# Patient Record
Sex: FEMALE | Race: OTHER | NOT HISPANIC OR LATINO
[De-identification: names, ages, dates, MRNs, and addresses within clinical notes are randomized per-mention and may not be internally consistent; named-entity substitution may affect disease eponyms.]

---

## 2017-02-01 ENCOUNTER — APPOINTMENT (OUTPATIENT)
Dept: INTERNAL MEDICINE | Facility: CLINIC | Age: 67
End: 2017-02-01

## 2017-02-08 ENCOUNTER — APPOINTMENT (OUTPATIENT)
Dept: INTERNAL MEDICINE | Facility: CLINIC | Age: 67
End: 2017-02-08

## 2017-08-31 ENCOUNTER — APPOINTMENT (OUTPATIENT)
Dept: INTERNAL MEDICINE | Facility: CLINIC | Age: 67
End: 2017-08-31
Payer: MEDICARE

## 2017-08-31 VITALS
SYSTOLIC BLOOD PRESSURE: 120 MMHG | TEMPERATURE: 98.2 F | DIASTOLIC BLOOD PRESSURE: 80 MMHG | BODY MASS INDEX: 26.08 KG/M2 | WEIGHT: 138 LBS | HEART RATE: 84 BPM

## 2017-08-31 DIAGNOSIS — F17.200 NICOTINE DEPENDENCE, UNSPECIFIED, UNCOMPLICATED: ICD-10-CM

## 2017-08-31 PROCEDURE — 99397 PER PM REEVAL EST PAT 65+ YR: CPT | Mod: 25

## 2017-08-31 PROCEDURE — 93000 ELECTROCARDIOGRAM COMPLETE: CPT

## 2017-08-31 RX ORDER — PNV NO.95/FERROUS FUM/FOLIC AC 28MG-0.8MG
TABLET ORAL
Refills: 0 | Status: ACTIVE | COMMUNITY

## 2017-09-01 LAB
ALBUMIN SERPL ELPH-MCNC: 4.4 G/DL
ALP BLD-CCNC: 53 U/L
ALT SERPL-CCNC: 21 U/L
ANION GAP SERPL CALC-SCNC: 16 MMOL/L
APPEARANCE: CLEAR
AST SERPL-CCNC: 28 U/L
BACTERIA: NEGATIVE
BASOPHILS # BLD AUTO: 0.02 K/UL
BASOPHILS NFR BLD AUTO: 0.3 %
BILIRUB SERPL-MCNC: 0.6 MG/DL
BILIRUBIN URINE: NEGATIVE
BLOOD URINE: NEGATIVE
BUN SERPL-MCNC: 18 MG/DL
CALCIUM SERPL-MCNC: 10.2 MG/DL
CHLORIDE SERPL-SCNC: 102 MMOL/L
CHOLEST SERPL-MCNC: 192 MG/DL
CHOLEST/HDLC SERPL: 2.4 RATIO
CO2 SERPL-SCNC: 22 MMOL/L
COLOR: YELLOW
CREAT SERPL-MCNC: 1.04 MG/DL
EOSINOPHIL # BLD AUTO: 0.05 K/UL
EOSINOPHIL NFR BLD AUTO: 0.8 %
GLUCOSE QUALITATIVE U: NORMAL MG/DL
GLUCOSE SERPL-MCNC: 104 MG/DL
HBA1C MFR BLD HPLC: 5.6 %
HCT VFR BLD CALC: 42.4 %
HDLC SERPL-MCNC: 79 MG/DL
HGB BLD-MCNC: 13.9 G/DL
HYALINE CASTS: 0 /LPF
IMM GRANULOCYTES NFR BLD AUTO: 0.2 %
KETONES URINE: NEGATIVE
LDLC SERPL CALC-MCNC: 101 MG/DL
LDLC SERPL DIRECT ASSAY-MCNC: 114 MG/DL
LEUKOCYTE ESTERASE URINE: NEGATIVE
LYMPHOCYTES # BLD AUTO: 2.49 K/UL
LYMPHOCYTES NFR BLD AUTO: 41 %
MAN DIFF?: NORMAL
MCHC RBC-ENTMCNC: 29.3 PG
MCHC RBC-ENTMCNC: 32.8 GM/DL
MCV RBC AUTO: 89.3 FL
MICROSCOPIC-UA: NORMAL
MONOCYTES # BLD AUTO: 0.43 K/UL
MONOCYTES NFR BLD AUTO: 7.1 %
NEUTROPHILS # BLD AUTO: 3.07 K/UL
NEUTROPHILS NFR BLD AUTO: 50.6 %
NITRITE URINE: NEGATIVE
PH URINE: 7
PLATELET # BLD AUTO: 162 K/UL
POTASSIUM SERPL-SCNC: 4.5 MMOL/L
PROT SERPL-MCNC: 7.1 G/DL
PROTEIN URINE: NEGATIVE MG/DL
RBC # BLD: 4.75 M/UL
RBC # FLD: 12.7 %
RED BLOOD CELLS URINE: 1 /HPF
SAVE SPECIMEN: NORMAL
SODIUM SERPL-SCNC: 140 MMOL/L
SPECIFIC GRAVITY URINE: 1.01
SQUAMOUS EPITHELIAL CELLS: 0 /HPF
TRIGL SERPL-MCNC: 58 MG/DL
TSH SERPL-ACNC: 0.78 UIU/ML
UROBILINOGEN URINE: NORMAL MG/DL
WBC # FLD AUTO: 6.07 K/UL
WHITE BLOOD CELLS URINE: 0 /HPF

## 2017-09-05 LAB — 25(OH)D3 SERPL-MCNC: 31.2 NG/ML

## 2017-10-03 ENCOUNTER — OTHER (OUTPATIENT)
Age: 67
End: 2017-10-03

## 2017-10-31 ENCOUNTER — APPOINTMENT (OUTPATIENT)
Dept: MAMMOGRAPHY | Facility: IMAGING CENTER | Age: 67
End: 2017-10-31
Payer: MEDICARE

## 2017-10-31 ENCOUNTER — OUTPATIENT (OUTPATIENT)
Dept: OUTPATIENT SERVICES | Facility: HOSPITAL | Age: 67
LOS: 1 days | End: 2017-10-31
Payer: MEDICARE

## 2017-10-31 DIAGNOSIS — Z12.31 ENCOUNTER FOR SCREENING MAMMOGRAM FOR MALIGNANT NEOPLASM OF BREAST: ICD-10-CM

## 2017-10-31 PROCEDURE — 77067 SCR MAMMO BI INCL CAD: CPT

## 2017-10-31 PROCEDURE — 77063 BREAST TOMOSYNTHESIS BI: CPT | Mod: 26

## 2017-10-31 PROCEDURE — 77063 BREAST TOMOSYNTHESIS BI: CPT

## 2017-10-31 PROCEDURE — G0202: CPT | Mod: 26

## 2017-11-03 ENCOUNTER — RESULT REVIEW (OUTPATIENT)
Age: 67
End: 2017-11-03

## 2017-11-20 ENCOUNTER — OTHER (OUTPATIENT)
Age: 67
End: 2017-11-20

## 2017-12-18 ENCOUNTER — APPOINTMENT (OUTPATIENT)
Dept: ULTRASOUND IMAGING | Facility: IMAGING CENTER | Age: 67
End: 2017-12-18

## 2017-12-26 ENCOUNTER — TRANSCRIPTION ENCOUNTER (OUTPATIENT)
Age: 67
End: 2017-12-26

## 2018-01-01 ENCOUNTER — TRANSCRIPTION ENCOUNTER (OUTPATIENT)
Age: 68
End: 2018-01-01

## 2018-02-06 ENCOUNTER — APPOINTMENT (OUTPATIENT)
Dept: INTERNAL MEDICINE | Facility: CLINIC | Age: 68
End: 2018-02-06
Payer: MEDICARE

## 2018-02-06 VITALS
HEART RATE: 81 BPM | OXYGEN SATURATION: 98 % | DIASTOLIC BLOOD PRESSURE: 82 MMHG | SYSTOLIC BLOOD PRESSURE: 122 MMHG | BODY MASS INDEX: 25.11 KG/M2 | HEIGHT: 61 IN | TEMPERATURE: 97.7 F | WEIGHT: 133 LBS

## 2018-02-06 DIAGNOSIS — R35.1 NOCTURIA: ICD-10-CM

## 2018-02-06 PROCEDURE — 36415 COLL VENOUS BLD VENIPUNCTURE: CPT

## 2018-02-06 PROCEDURE — 99214 OFFICE O/P EST MOD 30 MIN: CPT | Mod: 25

## 2018-02-06 PROCEDURE — 99406 BEHAV CHNG SMOKING 3-10 MIN: CPT | Mod: 25

## 2018-02-06 RX ORDER — AZITHROMYCIN 250 MG/1
250 TABLET, FILM COATED ORAL
Qty: 6 | Refills: 0 | Status: COMPLETED | COMMUNITY
Start: 2017-12-26

## 2018-02-06 RX ORDER — ZOLPIDEM TARTRATE 5 MG/1
5 TABLET, FILM COATED ORAL
Qty: 1 | Refills: 0 | Status: DISCONTINUED | COMMUNITY
Start: 2017-08-31 | End: 2018-02-06

## 2018-02-06 RX ORDER — ACETAMINOPHEN/DIPHENHYDRAMINE 500MG-25MG
TABLET ORAL
Refills: 0 | Status: DISCONTINUED | COMMUNITY
End: 2018-02-06

## 2018-02-07 LAB
ANION GAP SERPL CALC-SCNC: 13 MMOL/L
BUN SERPL-MCNC: 18 MG/DL
CALCIUM SERPL-MCNC: 9.9 MG/DL
CHLORIDE SERPL-SCNC: 100 MMOL/L
CO2 SERPL-SCNC: 26 MMOL/L
CREAT SERPL-MCNC: 1.07 MG/DL
GLUCOSE SERPL-MCNC: 108 MG/DL
HBA1C MFR BLD HPLC: 5.4 %
POTASSIUM SERPL-SCNC: 4.7 MMOL/L
SODIUM SERPL-SCNC: 139 MMOL/L
TSH SERPL-ACNC: 0.73 UIU/ML

## 2018-02-08 ENCOUNTER — RESULT REVIEW (OUTPATIENT)
Age: 68
End: 2018-02-08

## 2018-02-08 LAB — VIT B12 SERPL-MCNC: 1658 PG/ML

## 2018-02-14 ENCOUNTER — TRANSCRIPTION ENCOUNTER (OUTPATIENT)
Age: 68
End: 2018-02-14

## 2018-02-19 ENCOUNTER — CLINICAL ADVICE (OUTPATIENT)
Age: 68
End: 2018-02-19

## 2018-02-22 ENCOUNTER — APPOINTMENT (OUTPATIENT)
Dept: INTERNAL MEDICINE | Facility: CLINIC | Age: 68
End: 2018-02-22
Payer: MEDICARE

## 2018-02-22 VITALS
SYSTOLIC BLOOD PRESSURE: 118 MMHG | WEIGHT: 132 LBS | TEMPERATURE: 97.5 F | HEIGHT: 61 IN | DIASTOLIC BLOOD PRESSURE: 84 MMHG | BODY MASS INDEX: 24.92 KG/M2 | HEART RATE: 79 BPM | OXYGEN SATURATION: 98 %

## 2018-02-22 PROCEDURE — 99214 OFFICE O/P EST MOD 30 MIN: CPT

## 2018-02-26 ENCOUNTER — CLINICAL ADVICE (OUTPATIENT)
Age: 68
End: 2018-02-26

## 2018-02-27 ENCOUNTER — APPOINTMENT (OUTPATIENT)
Dept: INTERNAL MEDICINE | Facility: CLINIC | Age: 68
End: 2018-02-27

## 2018-06-21 DIAGNOSIS — M54.16 RADICULOPATHY, LUMBAR REGION: ICD-10-CM

## 2018-07-23 ENCOUNTER — APPOINTMENT (OUTPATIENT)
Dept: INTERNAL MEDICINE | Facility: CLINIC | Age: 68
End: 2018-07-23
Payer: MEDICARE

## 2018-07-23 VITALS
HEIGHT: 61 IN | SYSTOLIC BLOOD PRESSURE: 120 MMHG | WEIGHT: 132 LBS | DIASTOLIC BLOOD PRESSURE: 76 MMHG | BODY MASS INDEX: 24.92 KG/M2 | TEMPERATURE: 98 F

## 2018-07-23 DIAGNOSIS — Z87.2 PERSONAL HISTORY OF DISEASES OF THE SKIN AND SUBCUTANEOUS TISSUE: ICD-10-CM

## 2018-07-23 DIAGNOSIS — R20.8 OTHER DISTURBANCES OF SKIN SENSATION: ICD-10-CM

## 2018-07-23 DIAGNOSIS — Z87.898 PERSONAL HISTORY OF OTHER SPECIFIED CONDITIONS: ICD-10-CM

## 2018-07-23 DIAGNOSIS — Z11.59 ENCOUNTER FOR SCREENING FOR OTHER VIRAL DISEASES: ICD-10-CM

## 2018-07-23 DIAGNOSIS — Z12.31 ENCOUNTER FOR SCREENING MAMMOGRAM FOR MALIGNANT NEOPLASM OF BREAST: ICD-10-CM

## 2018-07-23 PROCEDURE — 99214 OFFICE O/P EST MOD 30 MIN: CPT

## 2018-07-23 NOTE — PHYSICAL EXAM
[Normal TMs] : both tympanic membranes were normal [de-identified] : nasal turbinates- mild congested. [de-identified] : hypopigmentation- with mild pinkish tone- on ant chest.

## 2018-07-23 NOTE — HISTORY OF PRESENT ILLNESS
[FreeTextEntry1] : f/u neuropathy [de-identified] : pt was seen by Neuro- had EMG- neuropathyL4-L5- pt was rec for physical theraphy.  occasional back pain- in the AM on awakening or sitting for prolonged period- has some pain.  no radiation of back pain.\par pt was told to physical therapy and given to gabapentin- pt takes dose at night and it helps\par \par no diarrhea now- pt cut down on cheese, yogart- pt is sure it is due to dairy and dosesn't want lactose intolerance test\par taking ambien- not using it\par \par R ear- decr hearing.  no ear pain\par \par chronic rash on ant chest- and was told it was fungus  given selsum blue- rash worse in the winter- mild improvement w treatment

## 2018-09-26 ENCOUNTER — APPOINTMENT (OUTPATIENT)
Dept: DERMATOLOGY | Facility: CLINIC | Age: 68
End: 2018-09-26

## 2018-10-02 ENCOUNTER — APPOINTMENT (OUTPATIENT)
Dept: INTERNAL MEDICINE | Facility: CLINIC | Age: 68
End: 2018-10-02
Payer: MEDICARE

## 2018-10-02 VITALS
WEIGHT: 134 LBS | HEIGHT: 61 IN | SYSTOLIC BLOOD PRESSURE: 120 MMHG | BODY MASS INDEX: 25.3 KG/M2 | DIASTOLIC BLOOD PRESSURE: 60 MMHG | TEMPERATURE: 97.5 F

## 2018-10-02 PROCEDURE — 99214 OFFICE O/P EST MOD 30 MIN: CPT

## 2018-10-02 NOTE — HISTORY OF PRESENT ILLNESS
[FreeTextEntry1] : foot pain [de-identified] : ear congestion- pt was seen by ENT- pt was given nose drops, and her ears were cleaned.  hearing is better. now congested occasionally.  pt states flonase irritated her nose\par \par pt was seen by Neuro for lumbar radiculopathy- tx w gabapentin, PT\par pt states she has R foot pain - mid arch- pt was told by other doctor that to ck for gout.  pt had pain-1  month- no daily but several times / wk\par \par \par never had colonoscopy- no chg in bm,  blood in stool, weight loss.  no family hx of colon cancer or colon polyps\par pt didn't do US breast for dense breast. \par \par rash- pt has appt with Derm next wk\par \par pt stopped dairy- and freq of bm improved- no diarrhea, abd pain\par pt is not completely compliant lactose free diet\par \par \par better when elevating the foot.  worse at night- megan after standing for prolonged period, humid temp.\par pt has relief w ibuprofen.  pain- mild-mod ache. duration of pain - several hrs. \par no heel pain.  has pain when she is still in bed\par in the AM - she has no pain.

## 2018-10-02 NOTE — PLAN
[FreeTextEntry1] : pt will do breast US.  \par pt refusing H breath test. \par pt refused flu vaccine, pneum vaccine\par f/u Derm\par nasal congestion- nasal saline\par pt doesn't want colonoscopy- rec cologuard

## 2018-10-02 NOTE — PHYSICAL EXAM
[de-identified] : varicose vein [de-identified] : no pain on palpation of L foot.  [de-identified] : hypopigmented macular rash on neck

## 2018-10-17 ENCOUNTER — FORM ENCOUNTER (OUTPATIENT)
Age: 68
End: 2018-10-17

## 2018-10-18 ENCOUNTER — LABORATORY RESULT (OUTPATIENT)
Age: 68
End: 2018-10-18

## 2018-10-18 ENCOUNTER — FORM ENCOUNTER (OUTPATIENT)
Age: 68
End: 2018-10-18

## 2018-10-18 ENCOUNTER — OUTPATIENT (OUTPATIENT)
Dept: OUTPATIENT SERVICES | Facility: HOSPITAL | Age: 68
LOS: 1 days | End: 2018-10-18
Payer: MEDICARE

## 2018-10-18 ENCOUNTER — APPOINTMENT (OUTPATIENT)
Dept: RADIOLOGY | Facility: IMAGING CENTER | Age: 68
End: 2018-10-18
Payer: MEDICARE

## 2018-10-18 ENCOUNTER — APPOINTMENT (OUTPATIENT)
Dept: INTERNAL MEDICINE | Facility: CLINIC | Age: 68
End: 2018-10-18
Payer: MEDICARE

## 2018-10-18 ENCOUNTER — APPOINTMENT (OUTPATIENT)
Dept: MAMMOGRAPHY | Facility: IMAGING CENTER | Age: 68
End: 2018-10-18
Payer: MEDICARE

## 2018-10-18 ENCOUNTER — RESULT CHARGE (OUTPATIENT)
Age: 68
End: 2018-10-18

## 2018-10-18 VITALS
WEIGHT: 132 LBS | DIASTOLIC BLOOD PRESSURE: 82 MMHG | BODY MASS INDEX: 24.92 KG/M2 | HEIGHT: 61 IN | OXYGEN SATURATION: 98 % | HEART RATE: 71 BPM | TEMPERATURE: 96 F | SYSTOLIC BLOOD PRESSURE: 120 MMHG

## 2018-10-18 DIAGNOSIS — H93.8X9 OTHER SPECIFIED DISORDERS OF EAR, UNSPECIFIED EAR: ICD-10-CM

## 2018-10-18 DIAGNOSIS — Z86.59 PERSONAL HISTORY OF OTHER MENTAL AND BEHAVIORAL DISORDERS: ICD-10-CM

## 2018-10-18 DIAGNOSIS — Z12.31 ENCOUNTER FOR SCREENING MAMMOGRAM FOR MALIGNANT NEOPLASM OF BREAST: ICD-10-CM

## 2018-10-18 DIAGNOSIS — Z87.898 PERSONAL HISTORY OF OTHER SPECIFIED CONDITIONS: ICD-10-CM

## 2018-10-18 PROCEDURE — 77067 SCR MAMMO BI INCL CAD: CPT

## 2018-10-18 PROCEDURE — 77063 BREAST TOMOSYNTHESIS BI: CPT | Mod: 26

## 2018-10-18 PROCEDURE — 77063 BREAST TOMOSYNTHESIS BI: CPT

## 2018-10-18 PROCEDURE — 77080 DXA BONE DENSITY AXIAL: CPT

## 2018-10-18 PROCEDURE — G0438: CPT | Mod: 25

## 2018-10-18 PROCEDURE — 93000 ELECTROCARDIOGRAM COMPLETE: CPT

## 2018-10-18 PROCEDURE — 94010 BREATHING CAPACITY TEST: CPT

## 2018-10-18 PROCEDURE — 99213 OFFICE O/P EST LOW 20 MIN: CPT | Mod: 25

## 2018-10-18 PROCEDURE — 77067 SCR MAMMO BI INCL CAD: CPT | Mod: 26

## 2018-10-18 RX ORDER — GABAPENTIN 300 MG/1
300 CAPSULE ORAL DAILY
Qty: 2 | Refills: 0 | Status: DISCONTINUED | COMMUNITY
End: 2018-10-18

## 2018-10-18 RX ORDER — PSYLLIUM HUSK 0.4 G
CAPSULE ORAL
Refills: 0 | Status: DISCONTINUED | COMMUNITY
End: 2018-10-18

## 2018-10-18 RX ORDER — ACETAMINOPHEN 325 MG
TABLET ORAL
Refills: 0 | Status: DISCONTINUED | COMMUNITY
End: 2018-10-18

## 2018-10-18 NOTE — PHYSICAL EXAM
[No Acute Distress] : no acute distress [Well Nourished] : well nourished [Well Developed] : well developed [Well-Appearing] : well-appearing [Normal Sclera/Conjunctiva] : normal sclera/conjunctiva [PERRL] : pupils equal round and reactive to light [Normal Outer Ear/Nose] : the outer ears and nose were normal in appearance [Normal Oropharynx] : the oropharynx was normal [Normal TMs] : both tympanic membranes were normal [Supple] : supple [No Lymphadenopathy] : no lymphadenopathy [Thyroid Normal, No Nodules] : the thyroid was normal and there were no nodules present [No Respiratory Distress] : no respiratory distress  [Clear to Auscultation] : lungs were clear to auscultation bilaterally [No Accessory Muscle Use] : no accessory muscle use [Normal Rate] : normal rate  [Regular Rhythm] : with a regular rhythm [Normal S1, S2] : normal S1 and S2 [No Murmur] : no murmur heard [No Carotid Bruits] : no carotid bruits [Pedal Pulses Present] : the pedal pulses are present [No Edema] : there was no peripheral edema [Soft] : abdomen soft [Non Tender] : non-tender [Non-distended] : non-distended [Normal Bowel Sounds] : normal bowel sounds [Normal Supraclavicular Nodes] : no supraclavicular lymphadenopathy [Normal Axillary Nodes] : no axillary lymphadenopathy [Normal Posterior Cervical Nodes] : no posterior cervical lymphadenopathy [Normal Anterior Cervical Nodes] : no anterior cervical lymphadenopathy [Normal Inguinal Nodes] : no inguinal lymphadenopathy [Grossly Normal Strength/Tone] : grossly normal strength/tone [Coordination Grossly Intact] : coordination grossly intact [No Focal Deficits] : no focal deficits [Normal Affect] : the affect was normal [Normal Insight/Judgement] : insight and judgment were intact [Normal Appearance] : normal in appearance [No Masses] : no palpable masses

## 2018-10-19 PROCEDURE — 77080 DXA BONE DENSITY AXIAL: CPT | Mod: 26

## 2018-10-19 NOTE — HEALTH RISK ASSESSMENT
[No falls in past year] : Patient reported no falls in the past year [0] : 2) Feeling down, depressed, or hopeless: Not at all (0) [Patient reported PAP Smear was normal] : Patient reported PAP Smear was normal [HIV test declined] : HIV test declined [Hepatitis C test offered] : Hepatitis C test offered [Language] : difficulty with language [Fully functional (bathing, dressing, toileting, transferring, walking, feeding)] : Fully functional (bathing, dressing, toileting, transferring, walking, feeding) [Fully functional (using the telephone, shopping, preparing meals, housekeeping, doing laundry, using] : Fully functional and needs no help or supervision to perform IADLs (using the telephone, shopping, preparing meals, housekeeping, doing laundry, using transportation, managing medications and managing finances) [Change in mental status noted] : No change in mental status noted [Learning/Retaining New Information] : denies difficulty learning/retaining new information [Handling Complex Tasks] : denies difficulty handling complex tasks [Spatial Ability and Orientation] : denies difficulty with spatial ability and orientation [Reports changes in hearing] : Reports no changes in hearing [Reports changes in vision] : Reports no changes in vision [MammogramDate] : 2017 [MammogramComments] : pt has appt today [PapSmearDate] : 2016 [BoneDensityComments] : today [de-identified] : occasional remembering names but does recall after some times [de-identified] : last seen ophtho- 12/18 [de-identified] : sees dental every 6 mo

## 2018-10-19 NOTE — PLAN
[FreeTextEntry1] : pt will f/u Pneum 13, flu vaccine. \par pt refused shingles vaccine\par EKG- NSR 68\par Spirometry- poor effort. FEV1/ FVC - nl

## 2018-10-19 NOTE — HISTORY OF PRESENT ILLNESS
[FreeTextEntry1] : CPE [de-identified] : vaccine- reviewed\par diet- healthy\par exercise- bicycle- daily- no CP or SOB\par pt getting better from a cold-still mild achy- doesn't want vaccine\par \par no rectal bleeding, chg in bm or abd pain.\par no fhx of colon ca or colon polyps\par \par occasional back pain only if lift object and \par

## 2018-10-22 ENCOUNTER — RESULT CHARGE (OUTPATIENT)
Age: 68
End: 2018-10-22

## 2018-10-24 ENCOUNTER — OTHER (OUTPATIENT)
Age: 68
End: 2018-10-24

## 2018-10-26 ENCOUNTER — OTHER (OUTPATIENT)
Age: 68
End: 2018-10-26

## 2018-10-26 RX ORDER — ALENDRONATE SODIUM 70 MG/1
70 TABLET ORAL DAILY
Qty: 15 | Refills: 5 | Status: DISCONTINUED | COMMUNITY
Start: 2017-08-31 | End: 2018-10-26

## 2018-11-19 ENCOUNTER — APPOINTMENT (OUTPATIENT)
Dept: INTERNAL MEDICINE | Facility: CLINIC | Age: 68
End: 2018-11-19
Payer: MEDICARE

## 2018-11-19 PROCEDURE — 36415 COLL VENOUS BLD VENIPUNCTURE: CPT

## 2018-11-20 ENCOUNTER — RESULT REVIEW (OUTPATIENT)
Age: 68
End: 2018-11-20

## 2018-11-28 LAB — HEMOCCULT STL QL IA: NEGATIVE

## 2019-01-24 ENCOUNTER — EMERGENCY (EMERGENCY)
Facility: HOSPITAL | Age: 69
LOS: 1 days | Discharge: HOME | End: 2019-01-24
Attending: EMERGENCY MEDICINE
Payer: MEDICARE

## 2019-01-24 VITALS
DIASTOLIC BLOOD PRESSURE: 79 MMHG | WEIGHT: 132.06 LBS | RESPIRATION RATE: 18 BRPM | SYSTOLIC BLOOD PRESSURE: 116 MMHG | HEIGHT: 66 IN | TEMPERATURE: 98 F | HEART RATE: 96 BPM | OXYGEN SATURATION: 99 %

## 2019-01-24 VITALS
TEMPERATURE: 98 F | HEART RATE: 89 BPM | OXYGEN SATURATION: 97 % | RESPIRATION RATE: 19 BRPM | SYSTOLIC BLOOD PRESSURE: 121 MMHG | DIASTOLIC BLOOD PRESSURE: 79 MMHG

## 2019-01-24 LAB
ALBUMIN SERPL ELPH-MCNC: 4.4 G/DL — SIGNIFICANT CHANGE UP (ref 3.3–5)
ALP SERPL-CCNC: 67 U/L — SIGNIFICANT CHANGE UP (ref 40–120)
ALT FLD-CCNC: 19 U/L — SIGNIFICANT CHANGE UP (ref 10–45)
ANION GAP SERPL CALC-SCNC: 12 MMOL/L — SIGNIFICANT CHANGE UP (ref 5–17)
AST SERPL-CCNC: 30 U/L — SIGNIFICANT CHANGE UP (ref 10–40)
BASE EXCESS BLDV CALC-SCNC: 1.1 MMOL/L — SIGNIFICANT CHANGE UP (ref -2–2)
BASOPHILS # BLD AUTO: 0 K/UL — SIGNIFICANT CHANGE UP (ref 0–0.2)
BASOPHILS NFR BLD AUTO: 0.4 % — SIGNIFICANT CHANGE UP (ref 0–2)
BILIRUB SERPL-MCNC: 0.8 MG/DL — SIGNIFICANT CHANGE UP (ref 0.2–1.2)
BUN SERPL-MCNC: 12 MG/DL — SIGNIFICANT CHANGE UP (ref 7–23)
CA-I SERPL-SCNC: 1.23 MMOL/L — SIGNIFICANT CHANGE UP (ref 1.12–1.3)
CALCIUM SERPL-MCNC: 9.5 MG/DL — SIGNIFICANT CHANGE UP (ref 8.4–10.5)
CHLORIDE BLDV-SCNC: 104 MMOL/L — SIGNIFICANT CHANGE UP (ref 96–108)
CHLORIDE SERPL-SCNC: 103 MMOL/L — SIGNIFICANT CHANGE UP (ref 96–108)
CO2 BLDV-SCNC: 28 MMOL/L — SIGNIFICANT CHANGE UP (ref 22–30)
CO2 SERPL-SCNC: 23 MMOL/L — SIGNIFICANT CHANGE UP (ref 22–31)
CREAT SERPL-MCNC: 0.94 MG/DL — SIGNIFICANT CHANGE UP (ref 0.5–1.3)
EOSINOPHIL # BLD AUTO: 0 K/UL — SIGNIFICANT CHANGE UP (ref 0–0.5)
EOSINOPHIL NFR BLD AUTO: 0.4 % — SIGNIFICANT CHANGE UP (ref 0–6)
GAS PNL BLDV: 135 MMOL/L — LOW (ref 136–145)
GAS PNL BLDV: SIGNIFICANT CHANGE UP
GAS PNL BLDV: SIGNIFICANT CHANGE UP
GLUCOSE BLDV-MCNC: 115 MG/DL — HIGH (ref 70–99)
GLUCOSE SERPL-MCNC: 119 MG/DL — HIGH (ref 70–99)
HCO3 BLDV-SCNC: 27 MMOL/L — SIGNIFICANT CHANGE UP (ref 21–29)
HCT VFR BLD CALC: 47.1 % — HIGH (ref 34.5–45)
HCT VFR BLDA CALC: 50 % — SIGNIFICANT CHANGE UP (ref 39–50)
HGB BLD CALC-MCNC: 16.2 G/DL — HIGH (ref 11.5–15.5)
HGB BLD-MCNC: 15.9 G/DL — HIGH (ref 11.5–15.5)
LACTATE BLDV-MCNC: 1.3 MMOL/L — SIGNIFICANT CHANGE UP (ref 0.7–2)
LYMPHOCYTES # BLD AUTO: 1.6 K/UL — SIGNIFICANT CHANGE UP (ref 1–3.3)
LYMPHOCYTES # BLD AUTO: 13.3 % — SIGNIFICANT CHANGE UP (ref 13–44)
MCHC RBC-ENTMCNC: 30.8 PG — SIGNIFICANT CHANGE UP (ref 27–34)
MCHC RBC-ENTMCNC: 33.8 GM/DL — SIGNIFICANT CHANGE UP (ref 32–36)
MCV RBC AUTO: 91.1 FL — SIGNIFICANT CHANGE UP (ref 80–100)
MONOCYTES # BLD AUTO: 0.6 K/UL — SIGNIFICANT CHANGE UP (ref 0–0.9)
MONOCYTES NFR BLD AUTO: 5.2 % — SIGNIFICANT CHANGE UP (ref 2–14)
NEUTROPHILS # BLD AUTO: 9.8 K/UL — HIGH (ref 1.8–7.4)
NEUTROPHILS NFR BLD AUTO: 80.8 % — HIGH (ref 43–77)
PCO2 BLDV: 47 MMHG — SIGNIFICANT CHANGE UP (ref 35–50)
PH BLDV: 7.37 — SIGNIFICANT CHANGE UP (ref 7.35–7.45)
PLATELET # BLD AUTO: 192 K/UL — SIGNIFICANT CHANGE UP (ref 150–400)
PO2 BLDV: 26 MMHG — SIGNIFICANT CHANGE UP (ref 25–45)
POTASSIUM BLDV-SCNC: 4 MMOL/L — SIGNIFICANT CHANGE UP (ref 3.5–5.3)
POTASSIUM SERPL-MCNC: 4.6 MMOL/L — SIGNIFICANT CHANGE UP (ref 3.5–5.3)
POTASSIUM SERPL-SCNC: 4.6 MMOL/L — SIGNIFICANT CHANGE UP (ref 3.5–5.3)
PROT SERPL-MCNC: 7.5 G/DL — SIGNIFICANT CHANGE UP (ref 6–8.3)
RBC # BLD: 5.17 M/UL — SIGNIFICANT CHANGE UP (ref 3.8–5.2)
RBC # FLD: 11.1 % — SIGNIFICANT CHANGE UP (ref 10.3–14.5)
SAO2 % BLDV: 43 % — LOW (ref 67–88)
SODIUM SERPL-SCNC: 138 MMOL/L — SIGNIFICANT CHANGE UP (ref 135–145)
WBC # BLD: 12.1 K/UL — HIGH (ref 3.8–10.5)
WBC # FLD AUTO: 12.1 K/UL — HIGH (ref 3.8–10.5)

## 2019-01-24 PROCEDURE — 85014 HEMATOCRIT: CPT

## 2019-01-24 PROCEDURE — 84132 ASSAY OF SERUM POTASSIUM: CPT

## 2019-01-24 PROCEDURE — 84295 ASSAY OF SERUM SODIUM: CPT

## 2019-01-24 PROCEDURE — 83605 ASSAY OF LACTIC ACID: CPT

## 2019-01-24 PROCEDURE — 96375 TX/PRO/DX INJ NEW DRUG ADDON: CPT

## 2019-01-24 PROCEDURE — 80053 COMPREHEN METABOLIC PANEL: CPT

## 2019-01-24 PROCEDURE — 82435 ASSAY OF BLOOD CHLORIDE: CPT

## 2019-01-24 PROCEDURE — 82330 ASSAY OF CALCIUM: CPT

## 2019-01-24 PROCEDURE — 99284 EMERGENCY DEPT VISIT MOD MDM: CPT | Mod: 25

## 2019-01-24 PROCEDURE — 96374 THER/PROPH/DIAG INJ IV PUSH: CPT

## 2019-01-24 PROCEDURE — 99284 EMERGENCY DEPT VISIT MOD MDM: CPT

## 2019-01-24 PROCEDURE — 82803 BLOOD GASES ANY COMBINATION: CPT

## 2019-01-24 PROCEDURE — 85027 COMPLETE CBC AUTOMATED: CPT

## 2019-01-24 PROCEDURE — 82947 ASSAY GLUCOSE BLOOD QUANT: CPT

## 2019-01-24 RX ORDER — ONDANSETRON 8 MG/1
4 TABLET, FILM COATED ORAL ONCE
Qty: 0 | Refills: 0 | Status: COMPLETED | OUTPATIENT
Start: 2019-01-24 | End: 2019-01-24

## 2019-01-24 RX ORDER — SODIUM CHLORIDE 9 MG/ML
1000 INJECTION INTRAMUSCULAR; INTRAVENOUS; SUBCUTANEOUS ONCE
Qty: 0 | Refills: 0 | Status: COMPLETED | OUTPATIENT
Start: 2019-01-24 | End: 2019-01-24

## 2019-01-24 RX ORDER — FAMOTIDINE 10 MG/ML
20 INJECTION INTRAVENOUS ONCE
Qty: 0 | Refills: 0 | Status: COMPLETED | OUTPATIENT
Start: 2019-01-24 | End: 2019-01-24

## 2019-01-24 RX ADMIN — ONDANSETRON 4 MILLIGRAM(S): 8 TABLET, FILM COATED ORAL at 16:52

## 2019-01-24 RX ADMIN — SODIUM CHLORIDE 2000 MILLILITER(S): 9 INJECTION INTRAMUSCULAR; INTRAVENOUS; SUBCUTANEOUS at 16:52

## 2019-01-24 RX ADMIN — FAMOTIDINE 20 MILLIGRAM(S): 10 INJECTION INTRAVENOUS at 16:52

## 2019-01-24 NOTE — ED PROVIDER NOTE - OBJECTIVE STATEMENT
68yof no pmhx here with 2 days of Nausea and vomiting along with diarrhea started last night. similar symx 2 weeks ago and was seen at [facility] MD and was prescribed zofran so took that this am at 7. NO fever or chills. No travel hx  no sick contacts.

## 2019-01-24 NOTE — ED PROVIDER NOTE - NSFOLLOWUPINSTRUCTIONS_ED_ALL_ED_FT
1. Follow up with your PMD in 24-48 hours.  2. Rest. Keep self hydrated, start with small amout of water every 20-30 minutes then advance to clears (ginger ale, Broth), then bland diet of BRAT diet (bananas, rice, apple, tea). Avoid diary, avoid spicy/fatty foods. Good Hand washing. Take zofran as prescribed. STOP taking ashwagandha   3. Return to the ER for fever, chills, vomiting, unable to eat or drink or any other concerns.

## 2019-01-24 NOTE — ED ADULT NURSE NOTE - NSIMPLEMENTINTERV_GEN_ALL_ED
Implemented All Universal Safety Interventions:  [place] to call system. Call bell, personal items and telephone within reach. Instruct patient to call for assistance. Room bathroom lighting operational. Non-slip footwear when patient is off stretcher. Physically safe environment: no spills, clutter or unnecessary equipment. Stretcher in lowest position, wheels locked, appropriate side rails in place.

## 2019-01-24 NOTE — ED PROVIDER NOTE - CARE PROVIDER_API CALL
Shane Galan), Gastroenterology; Internal Medicine  [address] [place] [address]  Phone: [phone]  Fax: [phone]

## 2019-01-24 NOTE — ED ADULT NURSE NOTE - OBJECTIVE STATEMENT
68 yr old female to Ed c/o N/V diarrhea x2 days. Had similar episode x 1 week ago [facility]en [facility], however sx returned Denies any one else sick in the house Has not eaten anything different Denies chest pain or SOB Afebrile Skin turgor norm Denies abd pain C/o feeling weak. No vomit at this time

## 2019-01-24 NOTE — ED PROVIDER NOTE - MEDICAL DECISION MAKING DETAILS
68yof no pmhx given by Podiatrist herbal supplement- ashwagandha 2 months ago, presented to ED with n/v/d on/off for 2 weeks. seen by [facility] and dx with viral gastroenteritis. pt felt better but since yesterday with similar symx. no fever or chills, no blood in stool. No recent traveling. abd benign. will obtain bloodwork, IVF, vbg, recommend stopping ashwagandha- which can give GI upset. and follow up PMD for further workup. ZR

## 2019-01-29 ENCOUNTER — OTHER (OUTPATIENT)
Age: 69
End: 2019-01-29

## 2019-04-02 ENCOUNTER — APPOINTMENT (OUTPATIENT)
Dept: INTERNAL MEDICINE | Facility: CLINIC | Age: 69
End: 2019-04-02

## 2019-05-24 ENCOUNTER — APPOINTMENT (OUTPATIENT)
Dept: INTERNAL MEDICINE | Facility: CLINIC | Age: 69
End: 2019-05-24
Payer: MEDICARE

## 2019-05-24 VITALS
OXYGEN SATURATION: 98 % | TEMPERATURE: 96.8 F | HEART RATE: 64 BPM | BODY MASS INDEX: 24.92 KG/M2 | DIASTOLIC BLOOD PRESSURE: 64 MMHG | HEIGHT: 61 IN | WEIGHT: 132 LBS | SYSTOLIC BLOOD PRESSURE: 100 MMHG

## 2019-05-24 DIAGNOSIS — Z11.59 ENCOUNTER FOR SCREENING FOR OTHER VIRAL DISEASES: ICD-10-CM

## 2019-05-24 PROCEDURE — 36415 COLL VENOUS BLD VENIPUNCTURE: CPT

## 2019-05-24 PROCEDURE — 99214 OFFICE O/P EST MOD 30 MIN: CPT | Mod: 25

## 2019-05-24 PROCEDURE — 99406 BEHAV CHNG SMOKING 3-10 MIN: CPT | Mod: 25

## 2019-05-24 NOTE — HISTORY OF PRESENT ILLNESS
[FreeTextEntry1] : chol [de-identified] : chol- pt wants to randi her chol level.  no eating too much dairy.  eating healthy.  exercise- bike, walking.  no CP or SOB\par IGT- decr sugar, carbs\par smoker- 2-3 cig/ wk. when nervous. pt doesn't want any aids to quit smoking- pt wants to try on her own. no quit date was selected. \par

## 2019-05-24 NOTE — PLAN
[FreeTextEntry1] : pt reminded about US breast .\par smoking cessation counseling - 3mins\par pt refused pneum 13 vac

## 2019-05-24 NOTE — COUNSELING
[Discussed Risks and Advised to Quit Smoking] : Discussed risks and advised to quit smoking [Discussed Cessation Medication] : cessation medication was discussed [Discussed Cessation Strategies] : cessation strategies were discussed [Quit Smoking] : Quit Smoking

## 2019-05-28 LAB
ALBUMIN SERPL ELPH-MCNC: 4.1 G/DL
ALP BLD-CCNC: 59 U/L
ALT SERPL-CCNC: 19 U/L
AST SERPL-CCNC: 30 U/L
BILIRUB DIRECT SERPL-MCNC: 0.1 MG/DL
BILIRUB INDIRECT SERPL-MCNC: 0.3 MG/DL
BILIRUB SERPL-MCNC: 0.4 MG/DL
CHOLEST SERPL-MCNC: 190 MG/DL
CHOLEST/HDLC SERPL: 2.5 RATIO
ESTIMATED AVERAGE GLUCOSE: 108 MG/DL
HBA1C MFR BLD HPLC: 5.4 %
HDLC SERPL-MCNC: 76 MG/DL
LDLC SERPL CALC-MCNC: 105 MG/DL
PROT SERPL-MCNC: 7.1 G/DL
TRIGL SERPL-MCNC: 47 MG/DL

## 2019-10-14 ENCOUNTER — APPOINTMENT (OUTPATIENT)
Dept: INTERNAL MEDICINE | Facility: CLINIC | Age: 69
End: 2019-10-14
Payer: MEDICARE

## 2019-10-14 VITALS
TEMPERATURE: 97.9 F | WEIGHT: 131 LBS | HEIGHT: 61 IN | BODY MASS INDEX: 24.73 KG/M2 | SYSTOLIC BLOOD PRESSURE: 120 MMHG | OXYGEN SATURATION: 98 % | DIASTOLIC BLOOD PRESSURE: 70 MMHG | HEART RATE: 78 BPM

## 2019-10-14 DIAGNOSIS — R10.9 UNSPECIFIED ABDOMINAL PAIN: ICD-10-CM

## 2019-10-14 PROCEDURE — 36415 COLL VENOUS BLD VENIPUNCTURE: CPT

## 2019-10-14 PROCEDURE — 99214 OFFICE O/P EST MOD 30 MIN: CPT | Mod: 25

## 2019-10-14 NOTE — PLAN
[FreeTextEntry1] : abd pressure- avoid dairy and all gas prod foods.  trial simethicone and f/u if not better.  if symptoms are worse, pt to f/u w me\par non fasting labs. \par smoking cessation discussed

## 2019-10-14 NOTE — HISTORY OF PRESENT ILLNESS
[FreeTextEntry8] : CC: abd \par no abd pain  but having freq bm and nauseous and pressure upper abd. no diarrhea, or loose stools- started last wk- Thursday.  pt doesn't remember what she ate on Thursday.  no family members were ill.  pt has similar symptoms once in a while\par no rectal bleeding, fever. pt has no hx of recent travel. last taken abt -  last winter. \par pt has lactose intolerance but tolerates small amt of dairy.  Has difficulty w fresh veg\par mild pressure in upper abd-intermittent- duration 30-60 min. occasional worse at night. symptoms start 1-2 hr after meals\par nausea last for a few sec.  pt thinks nausea is worse after meals. took pepto bismol , changed her diet which helped a little\par today- symptoms are 75% better.  \par \par chol- improved diet.\par tobacco- quit smoking but smokes 1 cig- every few weeks if she is stressed-

## 2019-10-16 ENCOUNTER — CLINICAL ADVICE (OUTPATIENT)
Age: 69
End: 2019-10-16

## 2019-10-17 ENCOUNTER — CLINICAL ADVICE (OUTPATIENT)
Age: 69
End: 2019-10-17

## 2019-10-17 LAB
ALBUMIN SERPL ELPH-MCNC: 4.4 G/DL
ALP BLD-CCNC: 61 U/L
ALT SERPL-CCNC: 16 U/L
AMYLASE/CREAT SERPL: 127 U/L
ANION GAP SERPL CALC-SCNC: 12 MMOL/L
AST SERPL-CCNC: 32 U/L
BASOPHILS # BLD AUTO: 0.02 K/UL
BASOPHILS NFR BLD AUTO: 0.3 %
BILIRUB SERPL-MCNC: 0.4 MG/DL
BUN SERPL-MCNC: 18 MG/DL
CALCIUM SERPL-MCNC: 10 MG/DL
CHLORIDE SERPL-SCNC: 104 MMOL/L
CO2 SERPL-SCNC: 24 MMOL/L
CREAT SERPL-MCNC: 1.11 MG/DL
EOSINOPHIL # BLD AUTO: 0.03 K/UL
EOSINOPHIL NFR BLD AUTO: 0.5 %
GLUCOSE SERPL-MCNC: 91 MG/DL
HCT VFR BLD CALC: 43.3 %
HGB BLD-MCNC: 14.3 G/DL
IMM GRANULOCYTES NFR BLD AUTO: 0.2 %
LPL SERPL-CCNC: 46 U/L
LYMPHOCYTES # BLD AUTO: 2.04 K/UL
LYMPHOCYTES NFR BLD AUTO: 34.2 %
MAN DIFF?: NORMAL
MCHC RBC-ENTMCNC: 30 PG
MCHC RBC-ENTMCNC: 33 GM/DL
MCV RBC AUTO: 91 FL
MONOCYTES # BLD AUTO: 0.36 K/UL
MONOCYTES NFR BLD AUTO: 6 %
NEUTROPHILS # BLD AUTO: 3.51 K/UL
NEUTROPHILS NFR BLD AUTO: 58.8 %
PLATELET # BLD AUTO: 161 K/UL
POTASSIUM SERPL-SCNC: 4.5 MMOL/L
PROT SERPL-MCNC: 7.2 G/DL
RBC # BLD: 4.76 M/UL
RBC # FLD: 11.9 %
SAVE SPECIMEN: NORMAL
SODIUM SERPL-SCNC: 140 MMOL/L
T3 SERPL-MCNC: 118 NG/DL
TSH SERPL-ACNC: 1.01 UIU/ML
WBC # FLD AUTO: 5.97 K/UL

## 2019-10-25 ENCOUNTER — APPOINTMENT (OUTPATIENT)
Dept: INTERNAL MEDICINE | Facility: CLINIC | Age: 69
End: 2019-10-25
Payer: MEDICARE

## 2019-10-25 ENCOUNTER — LABORATORY RESULT (OUTPATIENT)
Age: 69
End: 2019-10-25

## 2019-10-25 VITALS
WEIGHT: 130 LBS | DIASTOLIC BLOOD PRESSURE: 70 MMHG | SYSTOLIC BLOOD PRESSURE: 118 MMHG | BODY MASS INDEX: 24.55 KG/M2 | TEMPERATURE: 97.4 F | HEIGHT: 61 IN

## 2019-10-25 DIAGNOSIS — R11.0 NAUSEA: ICD-10-CM

## 2019-10-25 DIAGNOSIS — G47.00 INSOMNIA, UNSPECIFIED: ICD-10-CM

## 2019-10-25 DIAGNOSIS — F17.200 NICOTINE DEPENDENCE, UNSPECIFIED, UNCOMPLICATED: ICD-10-CM

## 2019-10-25 DIAGNOSIS — Z72.0 TOBACCO USE: ICD-10-CM

## 2019-10-25 DIAGNOSIS — Z71.6 TOBACCO ABUSE COUNSELING: ICD-10-CM

## 2019-10-25 DIAGNOSIS — R19.4 CHANGE IN BOWEL HABIT: ICD-10-CM

## 2019-10-25 PROCEDURE — G0438: CPT | Mod: 25

## 2019-10-25 PROCEDURE — 93000 ELECTROCARDIOGRAM COMPLETE: CPT

## 2019-10-25 PROCEDURE — 99214 OFFICE O/P EST MOD 30 MIN: CPT | Mod: 25

## 2019-10-25 PROCEDURE — 36415 COLL VENOUS BLD VENIPUNCTURE: CPT

## 2019-10-25 RX ORDER — SIMETHICONE 80 MG/1
80 TABLET, CHEWABLE ORAL
Qty: 30 | Refills: 0 | Status: DISCONTINUED | COMMUNITY
Start: 2019-10-14 | End: 2019-10-25

## 2019-10-25 NOTE — PHYSICAL EXAM
[No Acute Distress] : no acute distress [Well Nourished] : well nourished [Well Developed] : well developed [Well-Appearing] : well-appearing [PERRL] : pupils equal round and reactive to light [Normal Sclera/Conjunctiva] : normal sclera/conjunctiva [Normal Outer Ear/Nose] : the outer ears and nose were normal in appearance [Normal Oropharynx] : the oropharynx was normal [Normal TMs] : both tympanic membranes were normal [No Lymphadenopathy] : no lymphadenopathy [Supple] : supple [Thyroid Normal, No Nodules] : the thyroid was normal and there were no nodules present [No Respiratory Distress] : no respiratory distress  [No Accessory Muscle Use] : no accessory muscle use [Clear to Auscultation] : lungs were clear to auscultation bilaterally [Normal Rate] : normal rate  [Regular Rhythm] : with a regular rhythm [No Murmur] : no murmur heard [Normal S1, S2] : normal S1 and S2 [No Carotid Bruits] : no carotid bruits [No Edema] : there was no peripheral edema [Pedal Pulses Present] : the pedal pulses are present [Soft] : abdomen soft [Non Tender] : non-tender [Non-distended] : non-distended [Normal Bowel Sounds] : normal bowel sounds [Normal Supraclavicular Nodes] : no supraclavicular lymphadenopathy [Normal Axillary Nodes] : no axillary lymphadenopathy [Normal Posterior Cervical Nodes] : no posterior cervical lymphadenopathy [Normal Anterior Cervical Nodes] : no anterior cervical lymphadenopathy [Normal Inguinal Nodes] : no inguinal lymphadenopathy [Grossly Normal Strength/Tone] : grossly normal strength/tone [No Focal Deficits] : no focal deficits [Normal Gait] : normal gait [Normal Affect] : the affect was normal [Normal Insight/Judgement] : insight and judgment were intact [Normal Appearance] : normal in appearance [No Masses] : no palpable masses [No Axillary Lymphadenopathy] : no axillary lymphadenopathy

## 2019-10-26 ENCOUNTER — LABORATORY RESULT (OUTPATIENT)
Age: 69
End: 2019-10-26

## 2019-10-28 LAB
25(OH)D3 SERPL-MCNC: 26.1 NG/ML
AMYLASE/CREAT SERPL: 129 U/L
APPEARANCE: CLEAR
BILIRUBIN URINE: NEGATIVE
BLOOD URINE: NEGATIVE
CHOLEST SERPL-MCNC: 185 MG/DL
CHOLEST/HDLC SERPL: 2.5 RATIO
COLOR: NORMAL
ESTIMATED AVERAGE GLUCOSE: 108 MG/DL
GLUCOSE QUALITATIVE U: NEGATIVE
HBA1C MFR BLD HPLC: 5.4 %
HDLC SERPL-MCNC: 74 MG/DL
KETONES URINE: NEGATIVE
LDLC SERPL CALC-MCNC: 98 MG/DL
LDLC SERPL DIRECT ASSAY-MCNC: 114 MG/DL
LEUKOCYTE ESTERASE URINE: ABNORMAL
NITRITE URINE: NEGATIVE
PH URINE: 6.5
PROTEIN URINE: NEGATIVE
SAVE SPECIMEN: NORMAL
SPECIFIC GRAVITY URINE: 1.01
TRIGL SERPL-MCNC: 65 MG/DL
UROBILINOGEN URINE: NORMAL

## 2019-10-28 NOTE — HEALTH RISK ASSESSMENT
[Patient reported bone density results were abnormal] : Patient reported bone density results were abnormal [Patient reported mammogram was normal] : Patient reported mammogram was normal [Very Good] : ~his/her~ current health as very good [1 or 2 (0 pts)] : 1 or 2 (0 points) [Never (0 pts)] : Never (0 points) [No] : In the past 12 months have you used drugs other than those required for medical reasons? No [0] : 2) Feeling down, depressed, or hopeless: Not at all (0) [No falls in past year] : Patient reported no falls in the past year [HIV test declined] : HIV test declined [With Significant Other] : lives with significant other [Fully functional (bathing, dressing, toileting, transferring, walking, feeding)] : Fully functional (bathing, dressing, toileting, transferring, walking, feeding) [] :  [Fully functional (using the telephone, shopping, preparing meals, housekeeping, doing laundry, using] : Fully functional and needs no help or supervision to perform IADLs (using the telephone, shopping, preparing meals, housekeeping, doing laundry, using transportation, managing medications and managing finances) [Designated Healthcare Proxy] : Designated healthcare proxy [Relationship: ___] : Relationship: [unfilled] [Patient reported colonoscopy was normal] : Patient reported colonoscopy was normal [] : No [Change in mental status noted] : No change in mental status noted [Reports changes in hearing] : Reports no changes in hearing [Reports changes in vision] : Reports no changes in vision [MammogramDate] : 10/18 [PapSmearDate] : 12/18 [BoneDensityDate] : 10/18 [BoneDensityComments] : osteoporosis [ColonoscopyDate] : 2018 [ColonoscopyComments] : did FIT [de-identified] : last seen ophth- 11/18-has f/u appt [de-identified] : last seen dentist 2 mo ago [AdvancecareDate] : 10/19

## 2019-10-28 NOTE — HISTORY OF PRESENT ILLNESS
[FreeTextEntry1] : CPE [de-identified] : vaccine- refused shingrix, flu vaccine, tdap vaccine\par diet- compliant\par exercise- bicyle- no CP or SOB.\par hyperlipid-no specific diet\par IGT- compliant w diet\par tob-not smoking\par low back pain-chronic .  no chg\par osteoporosis\par burning in vaginal after urination - only after the middle of night- no symptoms during the day-started- started 2 yr ago- 1-2 episodes/wk\par no vaginal discharge\par insomnia

## 2019-10-28 NOTE — PLAN
[FreeTextEntry1] : \par Ekg- sinus lyudmila 59 IRBBB\par \par insomnia-sleep hygiene, trazodone-only occasion\par pt will f/u for vaccines

## 2019-10-30 ENCOUNTER — RX RENEWAL (OUTPATIENT)
Age: 69
End: 2019-10-30

## 2019-11-01 ENCOUNTER — CLINICAL ADVICE (OUTPATIENT)
Age: 69
End: 2019-11-01

## 2020-01-15 ENCOUNTER — APPOINTMENT (OUTPATIENT)
Dept: INTERNAL MEDICINE | Facility: CLINIC | Age: 70
End: 2020-01-15
Payer: MEDICARE

## 2020-01-15 VITALS
TEMPERATURE: 97.3 F | WEIGHT: 136 LBS | SYSTOLIC BLOOD PRESSURE: 120 MMHG | OXYGEN SATURATION: 98 % | HEIGHT: 61 IN | HEART RATE: 79 BPM | DIASTOLIC BLOOD PRESSURE: 82 MMHG | BODY MASS INDEX: 25.68 KG/M2

## 2020-01-15 DIAGNOSIS — Z87.898 PERSONAL HISTORY OF OTHER SPECIFIED CONDITIONS: ICD-10-CM

## 2020-01-15 PROCEDURE — 81002 URINALYSIS NONAUTO W/O SCOPE: CPT

## 2020-01-15 PROCEDURE — 36415 COLL VENOUS BLD VENIPUNCTURE: CPT

## 2020-01-15 PROCEDURE — 99214 OFFICE O/P EST MOD 30 MIN: CPT | Mod: 25

## 2020-01-15 NOTE — HISTORY OF PRESENT ILLNESS
[Mild] : mild [___ Days ago] : [unfilled] days ago [Episodic] : episodic [Improving] : improving [Nausea] : no nausea [Diarrhea] : no diarrhea [Vomiting] : no vomiting [Constipation] : no constipation [Abdominal Pain] : no abdominal pain [de-identified] : flavio johnson, stomach upset [FreeTextEntry6] : now stomach upset resolved- no symptoms for 2 days [FreeTextEntry8] : pt accidently drank calomine lotion instead of pepto bismol and then vomited.\par pt c/o severe burning w urination and L suprapubic discomfort ( duration- 10 days)- only at nighttime- not during the day- 6 day-no fever.  duration 10-15 min.  no assoc abd pain or nausea. \par pt c/o fatigue- by mid day- 6 days.  no myalgia.\par not sleeping well so wakes fatigue and has HA. HA-very mild- intermittent- start 6 days ago. duration of HA- 45 min.  resolves spontaneously but does also gets better w advil. nothing makes it worse. \par no wgt loss. no slurred speech, motor or sensory def.  had similar HA before when she is sick.  no HA triggers. \par

## 2020-01-15 NOTE — PLAN
[FreeTextEntry1] : dysuria- UA dipstick- pos leukocytes- tx w bactrim- pt tocall if not better or if worse.

## 2020-01-15 NOTE — PHYSICAL EXAM
[PERRL] : pupils equal round and reactive to light [Normal Oropharynx] : the oropharynx was normal [EOMI] : extraocular movements intact [Normal Gait] : normal gait [No Focal Deficits] : no focal deficits [Coordination Grossly Intact] : coordination grossly intact [de-identified] : CN2-12 nl.  finger- nose nl.  MS 5/5 UExt, LExt.

## 2020-01-16 ENCOUNTER — RESULT REVIEW (OUTPATIENT)
Age: 70
End: 2020-01-16

## 2020-01-17 ENCOUNTER — RESULT CHARGE (OUTPATIENT)
Age: 70
End: 2020-01-17

## 2020-01-17 LAB
ALBUMIN SERPL ELPH-MCNC: 4.9 G/DL
ALP BLD-CCNC: 78 U/L
ALT SERPL-CCNC: 25 U/L
ANION GAP SERPL CALC-SCNC: 13 MMOL/L
APPEARANCE: CLEAR
AST SERPL-CCNC: 28 U/L
BASOPHILS # BLD AUTO: 0.03 K/UL
BASOPHILS NFR BLD AUTO: 0.5 %
BILIRUB SERPL-MCNC: 0.3 MG/DL
BILIRUB UR QL STRIP: NEGATIVE
BILIRUBIN URINE: NEGATIVE
BLOOD URINE: NEGATIVE
BUN SERPL-MCNC: 20 MG/DL
CALCIUM SERPL-MCNC: 9.8 MG/DL
CHLORIDE SERPL-SCNC: 101 MMOL/L
CLARITY UR: CLEAR
CO2 SERPL-SCNC: 25 MMOL/L
COLLECTION METHOD: NORMAL
COLOR: COLORLESS
CREAT SERPL-MCNC: 0.97 MG/DL
EOSINOPHIL # BLD AUTO: 0.06 K/UL
EOSINOPHIL NFR BLD AUTO: 1 %
GLUCOSE QUALITATIVE U: NEGATIVE
GLUCOSE SERPL-MCNC: 109 MG/DL
GLUCOSE UR-MCNC: NEGATIVE
HCG UR QL: 0.2 EU/DL
HCT VFR BLD CALC: 45.4 %
HGB BLD-MCNC: 15.1 G/DL
HGB UR QL STRIP.AUTO: NEGATIVE
IMM GRANULOCYTES NFR BLD AUTO: 0.2 %
KETONES UR-MCNC: NEGATIVE
KETONES URINE: NEGATIVE
LEUKOCYTE ESTERASE UR QL STRIP: NORMAL
LEUKOCYTE ESTERASE URINE: NEGATIVE
LYMPHOCYTES # BLD AUTO: 2.45 K/UL
LYMPHOCYTES NFR BLD AUTO: 41 %
MAN DIFF?: NORMAL
MCHC RBC-ENTMCNC: 30.9 PG
MCHC RBC-ENTMCNC: 33.3 GM/DL
MCV RBC AUTO: 92.8 FL
MONOCYTES # BLD AUTO: 0.46 K/UL
MONOCYTES NFR BLD AUTO: 7.7 %
NEUTROPHILS # BLD AUTO: 2.96 K/UL
NEUTROPHILS NFR BLD AUTO: 49.6 %
NITRITE UR QL STRIP: NEGATIVE
NITRITE URINE: NEGATIVE
PH UR STRIP: 7
PH URINE: 7
PLATELET # BLD AUTO: 162 K/UL
POTASSIUM SERPL-SCNC: 5.2 MMOL/L
PROT SERPL-MCNC: 7.4 G/DL
PROT UR STRIP-MCNC: NEGATIVE
PROTEIN URINE: NEGATIVE
RBC # BLD: 4.89 M/UL
RBC # FLD: 12.3 %
SAVE SPECIMEN: NORMAL
SODIUM SERPL-SCNC: 139 MMOL/L
SP GR UR STRIP: 1.01
SPECIFIC GRAVITY URINE: 1
TSH SERPL-ACNC: 0.78 UIU/ML
UROBILINOGEN URINE: NORMAL
WBC # FLD AUTO: 5.97 K/UL

## 2020-01-31 ENCOUNTER — APPOINTMENT (OUTPATIENT)
Dept: UROLOGY | Facility: CLINIC | Age: 70
End: 2020-01-31
Payer: MEDICARE

## 2020-01-31 DIAGNOSIS — N95.2 POSTMENOPAUSAL ATROPHIC VAGINITIS: ICD-10-CM

## 2020-01-31 PROCEDURE — 99204 OFFICE O/P NEW MOD 45 MIN: CPT

## 2020-02-03 PROBLEM — N95.2 VAGINAL ATROPHY: Status: ACTIVE | Noted: 2020-02-03

## 2020-02-03 LAB
APPEARANCE: CLEAR
BACTERIA UR CULT: NORMAL
BACTERIA: NEGATIVE
BILIRUBIN URINE: NEGATIVE
BLOOD URINE: NEGATIVE
COLOR: COLORLESS
GLUCOSE QUALITATIVE U: NEGATIVE
HYALINE CASTS: 1 /LPF
KETONES URINE: NEGATIVE
LEUKOCYTE ESTERASE URINE: NEGATIVE
MICROSCOPIC-UA: NORMAL
NITRITE URINE: NEGATIVE
PH URINE: 6.5
PROTEIN URINE: NEGATIVE
RED BLOOD CELLS URINE: 1 /HPF
SPECIFIC GRAVITY URINE: 1.01
SQUAMOUS EPITHELIAL CELLS: 1 /HPF
UROBILINOGEN URINE: NORMAL
WHITE BLOOD CELLS URINE: 2 /HPF

## 2020-02-03 NOTE — ASSESSMENT
[FreeTextEntry1] : 68 yo F with urethral pain post micturation, left lower abdominal pain, vaginal atrophy\par \par - Discussed possible etiologies for LUTS. Discussed ways to manage them including behavioral modifications such as adequate hydration, controlling constipation, restricting fluids in the evening\par - Reviewed her vaginal atrophy which likely contributing to her symptoms. Estrace rx discussed and transmitted\par - Renal US\par - FU in 1 month

## 2020-02-03 NOTE — REVIEW OF SYSTEMS
[Feeling Tired] : feeling tired [Dry Eyes] : dryness of the eyes [Abdominal Pain] : abdominal pain [Heartburn] : heartburn [Strain or push to urinate] : strain or push to urinate [Bladder pressure] : experiences bladder pressure [Wake up at night to urinate  How many times?  ___] : wakes up to urinate [unfilled] times during the night [Feelings Of Weakness] : feelings of weakness [Negative] : Heme/Lymph [FreeTextEntry3] : frequent urination at daytime 8x

## 2020-02-03 NOTE — PHYSICAL EXAM
[General Appearance - Well Developed] : well developed [General Appearance - Well Nourished] : well nourished [Normal Appearance] : normal appearance [Well Groomed] : well groomed [General Appearance - In No Acute Distress] : no acute distress [Edema] : no peripheral edema [Respiration, Rhythm And Depth] : normal respiratory rhythm and effort [Exaggerated Use Of Accessory Muscles For Inspiration] : no accessory muscle use [Abdomen Soft] : soft [Abdomen Tenderness] : non-tender [Costovertebral Angle Tenderness] : no ~M costovertebral angle tenderness [Urinary Bladder Findings] : the bladder was normal on palpation [Normal Station and Gait] : the gait and station were normal for the patient's age [] : no rash [No Focal Deficits] : no focal deficits [Affect] : the affect was normal [Oriented To Time, Place, And Person] : oriented to person, place, and time [Mood] : the mood was normal [Not Anxious] : not anxious [No Palpable Adenopathy] : no palpable adenopathy [External Female Genitalia] : normal external genitalia [Urethral Meatus] : normal urethra [FreeTextEntry1] : moderate vaginal atrophy, neg CST, no prolapse

## 2020-02-03 NOTE — HISTORY OF PRESENT ILLNESS
[FreeTextEntry1] : 70 yo F presents with history of lower abdominal (left > right) and urethral pain\par Occurs post urination and lasts about 10 min\par Started about 2 weeks ago - nocturia 2-3/night\par Went to PCP - given abx but told to stop bc of negative cultures\par Seemed to get better a few days ago but symptoms returned last night\par During the day, sometimes dysuria but no abdominal pain which only seems to occur at night\par no gross hematuria\par normally, voiding q2hrs during the day, nocturia 2-3./night\par no incontinence \par Drinks usually 2-3 cups of water, now 5-6 cups of water, 1 cup of decafe coffee, 1 cup of chamomile tea in the evening\par LMP - 20 yrs ago, 2 children, \par chronic diarrhea\par not sexually active, no STI history

## 2020-02-04 ENCOUNTER — APPOINTMENT (OUTPATIENT)
Dept: UROLOGY | Facility: CLINIC | Age: 70
End: 2020-02-04

## 2020-03-06 ENCOUNTER — APPOINTMENT (OUTPATIENT)
Age: 70
End: 2020-03-06

## 2020-05-28 ENCOUNTER — APPOINTMENT (OUTPATIENT)
Dept: INTERNAL MEDICINE | Facility: CLINIC | Age: 70
End: 2020-05-28
Payer: MEDICARE

## 2020-05-28 VITALS
WEIGHT: 138 LBS | SYSTOLIC BLOOD PRESSURE: 122 MMHG | TEMPERATURE: 98.5 F | DIASTOLIC BLOOD PRESSURE: 80 MMHG | BODY MASS INDEX: 26.08 KG/M2

## 2020-05-28 PROCEDURE — 36415 COLL VENOUS BLD VENIPUNCTURE: CPT

## 2020-05-28 PROCEDURE — 99214 OFFICE O/P EST MOD 30 MIN: CPT | Mod: 25

## 2020-05-28 NOTE — PHYSICAL EXAM
[PERRL] : pupils equal round and reactive to light [Coordination Grossly Intact] : coordination grossly intact [Normal Gait] : normal gait [de-identified] : ears- b/l cerumen [de-identified] : MS5/5 Florin Freemant.  [de-identified] : PERRL, facial muscles nl. tongue- midline

## 2020-05-28 NOTE — HISTORY OF PRESENT ILLNESS
[FreeTextEntry8] : CC: imbalance, fatigue\par pt had sinus prob in April- pt went to [facility]- tx w amoxicillin but 2 wk and then took the abt. pt still feels congested. no facial pain or fever. no rhinnitis, itchy nose.  pt is now taking flonase\par last wk pt went to  for COVID test- neg COVID ab\par pt c/o dec energy- 2 wk, mild HA, throat burning.  feels like face is intermittently hot.\par no cough, diarrhea or SOB\par pt feels like she has mild imbalance-  started for 10 days ago- not problems w gait.  no vision chg or slurred speech or motor sensory chg\par HA - mild daily for 2 mo. \par  megan when getting up from bed-  she has back pain- same back pain she had previously. pt had w/u in 2018.

## 2020-05-29 LAB
25(OH)D3 SERPL-MCNC: 41.6 NG/ML
ALBUMIN SERPL ELPH-MCNC: 4.4 G/DL
ALP BLD-CCNC: 62 U/L
ALT SERPL-CCNC: 22 U/L
ANION GAP SERPL CALC-SCNC: 12 MMOL/L
AST SERPL-CCNC: 30 U/L
BASOPHILS # BLD AUTO: 0.02 K/UL
BASOPHILS NFR BLD AUTO: 0.3 %
BILIRUB SERPL-MCNC: 0.4 MG/DL
BUN SERPL-MCNC: 16 MG/DL
CALCIUM SERPL-MCNC: 10.2 MG/DL
CHLORIDE SERPL-SCNC: 99 MMOL/L
CO2 SERPL-SCNC: 24 MMOL/L
CREAT SERPL-MCNC: 0.94 MG/DL
EOSINOPHIL # BLD AUTO: 0.04 K/UL
EOSINOPHIL NFR BLD AUTO: 0.6 %
GLUCOSE SERPL-MCNC: 101 MG/DL
HCT VFR BLD CALC: 44.8 %
HGB BLD-MCNC: 14.6 G/DL
IMM GRANULOCYTES NFR BLD AUTO: 0.2 %
LYMPHOCYTES # BLD AUTO: 1.72 K/UL
LYMPHOCYTES NFR BLD AUTO: 27.9 %
MAN DIFF?: NORMAL
MCHC RBC-ENTMCNC: 30.1 PG
MCHC RBC-ENTMCNC: 32.6 GM/DL
MCV RBC AUTO: 92.4 FL
MONOCYTES # BLD AUTO: 0.42 K/UL
MONOCYTES NFR BLD AUTO: 6.8 %
NEUTROPHILS # BLD AUTO: 3.96 K/UL
NEUTROPHILS NFR BLD AUTO: 64.2 %
PLATELET # BLD AUTO: 154 K/UL
POTASSIUM SERPL-SCNC: 5 MMOL/L
PROT SERPL-MCNC: 7.2 G/DL
RBC # BLD: 4.85 M/UL
RBC # FLD: 12.1 %
SAVE SPECIMEN: NORMAL
SODIUM SERPL-SCNC: 136 MMOL/L
T3 SERPL-MCNC: 121 NG/DL
T4 FREE SERPL-MCNC: 1.5 NG/DL
TSH SERPL-ACNC: 1.19 UIU/ML
WBC # FLD AUTO: 6.17 K/UL

## 2020-08-06 NOTE — ED ADULT NURSE NOTE - CINV DISCH MEDS REVIEWED YN
Detail Level: Simple Additional Notes: Patient consent was obtained to proceed with the visit and recommended plan of care after discussion of all risks and benefits, including the risks of COVID-19 exposure. n/a

## 2020-11-30 ENCOUNTER — APPOINTMENT (OUTPATIENT)
Dept: INTERNAL MEDICINE | Facility: CLINIC | Age: 70
End: 2020-11-30

## 2021-01-11 ENCOUNTER — NON-APPOINTMENT (OUTPATIENT)
Age: 71
End: 2021-01-11

## 2021-01-25 ENCOUNTER — APPOINTMENT (OUTPATIENT)
Dept: INTERNAL MEDICINE | Facility: CLINIC | Age: 71
End: 2021-01-25
Payer: MEDICARE

## 2021-01-25 ENCOUNTER — NON-APPOINTMENT (OUTPATIENT)
Age: 71
End: 2021-01-25

## 2021-01-25 VITALS
SYSTOLIC BLOOD PRESSURE: 124 MMHG | WEIGHT: 138 LBS | HEART RATE: 80 BPM | HEIGHT: 61 IN | TEMPERATURE: 97.6 F | BODY MASS INDEX: 26.06 KG/M2 | OXYGEN SATURATION: 97 % | DIASTOLIC BLOOD PRESSURE: 80 MMHG

## 2021-01-25 DIAGNOSIS — R09.81 NASAL CONGESTION: ICD-10-CM

## 2021-01-25 DIAGNOSIS — R51.9 HEADACHE, UNSPECIFIED: ICD-10-CM

## 2021-01-25 DIAGNOSIS — R21 RASH AND OTHER NONSPECIFIC SKIN ERUPTION: ICD-10-CM

## 2021-01-25 DIAGNOSIS — R74.8 ABNORMAL LEVELS OF OTHER SERUM ENZYMES: ICD-10-CM

## 2021-01-25 DIAGNOSIS — Z87.898 PERSONAL HISTORY OF OTHER SPECIFIED CONDITIONS: ICD-10-CM

## 2021-01-25 PROCEDURE — 99214 OFFICE O/P EST MOD 30 MIN: CPT | Mod: 25

## 2021-01-25 PROCEDURE — G0438: CPT

## 2021-01-25 PROCEDURE — 99072 ADDL SUPL MATRL&STAF TM PHE: CPT

## 2021-01-25 PROCEDURE — 36415 COLL VENOUS BLD VENIPUNCTURE: CPT

## 2021-01-25 PROCEDURE — 93000 ELECTROCARDIOGRAM COMPLETE: CPT

## 2021-01-25 RX ORDER — MONTELUKAST 10 MG/1
10 TABLET, FILM COATED ORAL DAILY
Qty: 1 | Refills: 0 | Status: DISCONTINUED | COMMUNITY
Start: 2020-05-29 | End: 2021-01-25

## 2021-01-25 RX ORDER — ELECTROLYTES/DEXTROSE
SOLUTION, ORAL ORAL
Refills: 0 | Status: ACTIVE | COMMUNITY

## 2021-01-25 RX ORDER — TRAZODONE HYDROCHLORIDE 50 MG/1
50 TABLET ORAL DAILY
Qty: 30 | Refills: 0 | Status: DISCONTINUED | COMMUNITY
Start: 2019-10-25 | End: 2021-01-25

## 2021-01-25 RX ORDER — AZELASTINE HYDROCHLORIDE 205.5 UG/1
0.15 SPRAY, METERED NASAL
Qty: 1 | Refills: 0 | Status: DISCONTINUED | COMMUNITY
Start: 2020-05-28 | End: 2021-01-25

## 2021-01-25 RX ORDER — ESTRADIOL 0.1 MG/G
0.1 CREAM VAGINAL
Qty: 1 | Refills: 3 | Status: DISCONTINUED | COMMUNITY
Start: 2020-01-31 | End: 2021-01-25

## 2021-01-25 RX ORDER — SULFAMETHOXAZOLE AND TRIMETHOPRIM 800; 160 MG/1; MG/1
800-160 TABLET ORAL TWICE DAILY
Qty: 6 | Refills: 0 | Status: DISCONTINUED | COMMUNITY
Start: 2020-01-15 | End: 2021-01-25

## 2021-01-25 NOTE — HEALTH RISK ASSESSMENT
[Never (0 pts)] : Never (0 points) [No] : In the past 12 months have you used drugs other than those required for medical reasons? No [Patient reported mammogram was normal] : Patient reported mammogram was normal [Patient reported bone density results were abnormal] : Patient reported bone density results were abnormal [HIV test declined] : HIV test declined [Hepatitis C test declined] : Hepatitis C test declined [With Patient/Caregiver] : With Patient/Caregiver [Good] : ~his/her~  mood as  good [No falls in past year] : Patient reported no falls in the past year [0] : 2) Feeling down, depressed, or hopeless: Not at all (0) [Patient reported PAP Smear was normal] : Patient reported PAP Smear was normal [With Significant Other] : lives with significant other [Feels Safe at Home] : Feels safe at home [Fully functional (bathing, dressing, toileting, transferring, walking, feeding)] : Fully functional (bathing, dressing, toileting, transferring, walking, feeding) [Fully functional (using the telephone, shopping, preparing meals, housekeeping, doing laundry, using] : Fully functional and needs no help or supervision to perform IADLs (using the telephone, shopping, preparing meals, housekeeping, doing laundry, using transportation, managing medications and managing finances) [Smoke Detector] : smoke detector [Carbon Monoxide Detector] : carbon monoxide detector [Seat Belt] :  uses seat belt [Sunscreen] : uses sunscreen [Relationship: ___] : Relationship: [unfilled] [] : No [Audit-CScore] : 0 [de-identified] : 0 [Change in mental status noted] : No change in mental status noted [High Risk Behavior] : no high risk behavior [Reports changes in hearing] : Reports no changes in hearing [Reports changes in vision] : Reports no changes in vision [Reports changes in dental health] : Reports no changes in dental health [Guns at Home] : no guns at home [MammogramDate] : 10/18 [PapSmearDate] : 01/18 [BoneDensityDate] : 10/18 [BoneDensityComments] : osteoporosis [ColonoscopyDate] : 11/19 [de-identified] :  [de-identified] : wears glasses.  seen ophth 2 mo ago [de-identified] : seen dentist 3 mo ago [AdvancecareDate] : 01/21

## 2021-01-25 NOTE — PHYSICAL EXAM
[No Acute Distress] : no acute distress [Well Nourished] : well nourished [Well Developed] : well developed [Well-Appearing] : well-appearing [Normal Sclera/Conjunctiva] : normal sclera/conjunctiva [PERRL] : pupils equal round and reactive to light [Normal Outer Ear/Nose] : the outer ears and nose were normal in appearance [Normal TMs] : both tympanic membranes were normal [No Lymphadenopathy] : no lymphadenopathy [Supple] : supple [Thyroid Normal, No Nodules] : the thyroid was normal and there were no nodules present [No Respiratory Distress] : no respiratory distress  [No Accessory Muscle Use] : no accessory muscle use [Clear to Auscultation] : lungs were clear to auscultation bilaterally [Normal Rate] : normal rate  [Regular Rhythm] : with a regular rhythm [Normal S1, S2] : normal S1 and S2 [No Murmur] : no murmur heard [No Carotid Bruits] : no carotid bruits [Pedal Pulses Present] : the pedal pulses are present [No Edema] : there was no peripheral edema [Soft] : abdomen soft [Non Tender] : non-tender [Non-distended] : non-distended [Normal Bowel Sounds] : normal bowel sounds [Normal Supraclavicular Nodes] : no supraclavicular lymphadenopathy [Normal Axillary Nodes] : no axillary lymphadenopathy [Normal Posterior Cervical Nodes] : no posterior cervical lymphadenopathy [Normal Anterior Cervical Nodes] : no anterior cervical lymphadenopathy [Normal Inguinal Nodes] : no inguinal lymphadenopathy [Grossly Normal Strength/Tone] : grossly normal strength/tone [No Focal Deficits] : no focal deficits [Normal Gait] : normal gait [Normal Affect] : the affect was normal [Normal Insight/Judgement] : insight and judgment were intact [Normal Appearance] : normal in appearance [No Masses] : no palpable masses [No Axillary Lymphadenopathy] : no axillary lymphadenopathy [de-identified] : no sinus tenderness

## 2021-01-25 NOTE — HISTORY OF PRESENT ILLNESS
[FreeTextEntry1] : CPE [de-identified] : vaccine- pneum , shingrix vac after COVID.  reviewed labs and notes from 5/28/20\par diet- healthy diet reviewed\par exercise- walking\par lactose intolerance- gets bloating w dairy- no diarrhea\par Osteoporosis-off meds.\par lumbar radiculopathy- / neuropathy- seen Neuro- Dr. West.  MRI spine was done \par sinus- prev was put antibiotic. pt has nasal congestion, HA- started Oct. - after abt it got better but symptoms returned.  no fever \par nasal dischg - clear.  no facial pain\par HA- thinks it is due to sinus\par imbalance- resolved

## 2021-01-26 DIAGNOSIS — Z87.898 PERSONAL HISTORY OF OTHER SPECIFIED CONDITIONS: ICD-10-CM

## 2021-01-26 LAB
25(OH)D3 SERPL-MCNC: 33.8 NG/ML
ALBUMIN SERPL ELPH-MCNC: 4.5 G/DL
ALP BLD-CCNC: 79 U/L
ALT SERPL-CCNC: 23 U/L
ANION GAP SERPL CALC-SCNC: 13 MMOL/L
AST SERPL-CCNC: 31 U/L
BASOPHILS # BLD AUTO: 0.03 K/UL
BASOPHILS NFR BLD AUTO: 0.5 %
BILIRUB SERPL-MCNC: 0.6 MG/DL
BUN SERPL-MCNC: 17 MG/DL
CALCIUM SERPL-MCNC: 10 MG/DL
CHLORIDE SERPL-SCNC: 101 MMOL/L
CHOLEST SERPL-MCNC: 196 MG/DL
CO2 SERPL-SCNC: 24 MMOL/L
CREAT SERPL-MCNC: 0.98 MG/DL
EOSINOPHIL # BLD AUTO: 0.06 K/UL
EOSINOPHIL NFR BLD AUTO: 1 %
ESTIMATED AVERAGE GLUCOSE: 111 MG/DL
GLUCOSE SERPL-MCNC: 95 MG/DL
HBA1C MFR BLD HPLC: 5.5 %
HCT VFR BLD CALC: 43.2 %
HDLC SERPL-MCNC: 80 MG/DL
HGB BLD-MCNC: 14.3 G/DL
IMM GRANULOCYTES NFR BLD AUTO: 0.2 %
LDLC SERPL CALC-MCNC: 104 MG/DL
LDLC SERPL DIRECT ASSAY-MCNC: 108 MG/DL
LYMPHOCYTES # BLD AUTO: 2.43 K/UL
LYMPHOCYTES NFR BLD AUTO: 41.1 %
MAN DIFF?: NORMAL
MCHC RBC-ENTMCNC: 30.9 PG
MCHC RBC-ENTMCNC: 33.1 GM/DL
MCV RBC AUTO: 93.3 FL
MONOCYTES # BLD AUTO: 0.52 K/UL
MONOCYTES NFR BLD AUTO: 8.8 %
NEUTROPHILS # BLD AUTO: 2.86 K/UL
NEUTROPHILS NFR BLD AUTO: 48.4 %
NONHDLC SERPL-MCNC: 116 MG/DL
PLATELET # BLD AUTO: 168 K/UL
POTASSIUM SERPL-SCNC: 4.8 MMOL/L
PROT SERPL-MCNC: 7 G/DL
RBC # BLD: 4.63 M/UL
RBC # FLD: 12.2 %
SAVE SPECIMEN: NORMAL
SODIUM SERPL-SCNC: 139 MMOL/L
T4 FREE SERPL-MCNC: 1.6 NG/DL
TRIGL SERPL-MCNC: 62 MG/DL
TSH SERPL-ACNC: 1.05 UIU/ML
WBC # FLD AUTO: 5.91 K/UL

## 2021-01-27 ENCOUNTER — NON-APPOINTMENT (OUTPATIENT)
Age: 71
End: 2021-01-27

## 2021-04-14 ENCOUNTER — APPOINTMENT (OUTPATIENT)
Dept: INTERNAL MEDICINE | Facility: CLINIC | Age: 71
End: 2021-04-14
Payer: MEDICARE

## 2021-04-14 PROCEDURE — 99072 ADDL SUPL MATRL&STAF TM PHE: CPT

## 2021-04-14 PROCEDURE — 77080 DXA BONE DENSITY AXIAL: CPT

## 2021-04-16 RX ORDER — CHOLECALCIFEROL (VITAMIN D3) 25 MCG
25 MCG TABLET ORAL
Qty: 90 | Refills: 3 | Status: ACTIVE | COMMUNITY
Start: 2021-04-16 | End: 1900-01-01

## 2021-07-19 ENCOUNTER — RX RENEWAL (OUTPATIENT)
Age: 71
End: 2021-07-19

## 2021-10-20 ENCOUNTER — RX RENEWAL (OUTPATIENT)
Age: 71
End: 2021-10-20

## 2021-11-12 ENCOUNTER — RX RENEWAL (OUTPATIENT)
Age: 71
End: 2021-11-12

## 2021-12-29 ENCOUNTER — APPOINTMENT (OUTPATIENT)
Dept: INTERNAL MEDICINE | Facility: CLINIC | Age: 71
End: 2021-12-29
Payer: MEDICARE

## 2021-12-29 VITALS
HEIGHT: 61 IN | DIASTOLIC BLOOD PRESSURE: 90 MMHG | OXYGEN SATURATION: 98 % | HEART RATE: 79 BPM | SYSTOLIC BLOOD PRESSURE: 144 MMHG | BODY MASS INDEX: 26.43 KG/M2 | WEIGHT: 140 LBS

## 2021-12-29 PROCEDURE — 99212 OFFICE O/P EST SF 10 MIN: CPT

## 2021-12-29 NOTE — HISTORY OF PRESENT ILLNESS
[Congestion] : congestion [Cough] : cough [Moderate] : moderate [___ Days ago] : [unfilled] days ago [Sudden] : suddenly [Constant] : constant [Headache] : headache [Worsening] : worsening

## 2022-01-02 NOTE — PHYSICAL EXAM
[No Acute Distress] : no acute distress [Well Nourished] : well nourished [Well Developed] : well developed [Normal Outer Ear/Nose] : the outer ears and nose were normal in appearance [Normal Oropharynx] : the oropharynx was normal [Normal TMs] : both tympanic membranes were normal [No Respiratory Distress] : no respiratory distress  [No Accessory Muscle Use] : no accessory muscle use [Clear to Auscultation] : lungs were clear to auscultation bilaterally [Normal Rate] : normal rate  [Regular Rhythm] : with a regular rhythm [Normal S1, S2] : normal S1 and S2 [de-identified] : nasal turbinates swollen,frontal sinus tenderness

## 2022-01-25 ENCOUNTER — NON-APPOINTMENT (OUTPATIENT)
Age: 72
End: 2022-01-25

## 2022-02-01 ENCOUNTER — LABORATORY RESULT (OUTPATIENT)
Age: 72
End: 2022-02-01

## 2022-02-01 ENCOUNTER — NON-APPOINTMENT (OUTPATIENT)
Age: 72
End: 2022-02-01

## 2022-02-01 ENCOUNTER — APPOINTMENT (OUTPATIENT)
Dept: INTERNAL MEDICINE | Facility: CLINIC | Age: 72
End: 2022-02-01
Payer: MEDICARE

## 2022-02-01 VITALS
SYSTOLIC BLOOD PRESSURE: 120 MMHG | OXYGEN SATURATION: 98 % | TEMPERATURE: 96.9 F | DIASTOLIC BLOOD PRESSURE: 75 MMHG | HEIGHT: 61 IN | WEIGHT: 138 LBS | HEART RATE: 65 BPM | BODY MASS INDEX: 26.06 KG/M2

## 2022-02-01 VITALS — DIASTOLIC BLOOD PRESSURE: 80 MMHG | SYSTOLIC BLOOD PRESSURE: 120 MMHG

## 2022-02-01 DIAGNOSIS — M79.672 PAIN IN LEFT FOOT: ICD-10-CM

## 2022-02-01 DIAGNOSIS — Z87.09 PERSONAL HISTORY OF OTHER DISEASES OF THE RESPIRATORY SYSTEM: ICD-10-CM

## 2022-02-01 PROCEDURE — 36415 COLL VENOUS BLD VENIPUNCTURE: CPT

## 2022-02-01 PROCEDURE — 93000 ELECTROCARDIOGRAM COMPLETE: CPT

## 2022-02-01 PROCEDURE — G0439: CPT

## 2022-02-01 PROCEDURE — 99214 OFFICE O/P EST MOD 30 MIN: CPT | Mod: 25

## 2022-02-01 RX ORDER — AZITHROMYCIN 250 MG/1
250 TABLET, FILM COATED ORAL
Qty: 1 | Refills: 0 | Status: DISCONTINUED | COMMUNITY
Start: 2021-12-29 | End: 2022-02-01

## 2022-02-01 RX ORDER — FLUTICASONE PROPIONATE 50 UG/1
50 SPRAY, METERED NASAL DAILY
Qty: 16 | Refills: 1 | Status: DISCONTINUED | COMMUNITY
Start: 2020-05-28 | End: 2022-02-01

## 2022-02-01 RX ORDER — CICLOPIROX 7.7 MG/G
0.77 GEL TOPICAL
Qty: 30 | Refills: 0 | Status: DISCONTINUED | COMMUNITY
Start: 2020-12-21 | End: 2022-02-01

## 2022-02-01 NOTE — HISTORY OF PRESENT ILLNESS
[FreeTextEntry1] : CPE [de-identified] : vaccine- pneum , shingrix, flu vac - pt refused\par diet- healthy diet reviewed\par exercise- stationary bike- 20-30 min- no CP or SOB. \par elev BP\par fatigue by the weekend. pt works during the weekday.  pt does well during the week\par lactose intolerance- gets bloating w dairy- no diarrhea.  eating kosher dairy.  no abd bloating\par Osteoporosis-on fosamax\par lumbar radiculopathy- / neuropathy- rarely bothers her.\par chol- ran out of meds 2 mo ago\par \par

## 2022-02-01 NOTE — PHYSICAL EXAM
[No Acute Distress] : no acute distress [Well Nourished] : well nourished [Well Developed] : well developed [Well-Appearing] : well-appearing [Normal Sclera/Conjunctiva] : normal sclera/conjunctiva [PERRL] : pupils equal round and reactive to light [Normal Outer Ear/Nose] : the outer ears and nose were normal in appearance [Normal TMs] : both tympanic membranes were normal [No Lymphadenopathy] : no lymphadenopathy [Supple] : supple [Thyroid Normal, No Nodules] : the thyroid was normal and there were no nodules present [No Respiratory Distress] : no respiratory distress  [No Accessory Muscle Use] : no accessory muscle use [Clear to Auscultation] : lungs were clear to auscultation bilaterally [Normal Rate] : normal rate  [Regular Rhythm] : with a regular rhythm [Normal S1, S2] : normal S1 and S2 [No Murmur] : no murmur heard [No Carotid Bruits] : no carotid bruits [Pedal Pulses Present] : the pedal pulses are present [No Edema] : there was no peripheral edema [Normal Appearance] : normal in appearance [No Axillary Lymphadenopathy] : no axillary lymphadenopathy [Soft] : abdomen soft [Non Tender] : non-tender [Non-distended] : non-distended [No Masses] : no abdominal mass palpated [Normal Bowel Sounds] : normal bowel sounds [Normal Supraclavicular Nodes] : no supraclavicular lymphadenopathy [Normal Axillary Nodes] : no axillary lymphadenopathy [Normal Posterior Cervical Nodes] : no posterior cervical lymphadenopathy [Normal Anterior Cervical Nodes] : no anterior cervical lymphadenopathy [Normal Inguinal Nodes] : no inguinal lymphadenopathy [Grossly Normal Strength/Tone] : grossly normal strength/tone [No Focal Deficits] : no focal deficits [Normal Gait] : normal gait [Normal Affect] : the affect was normal [Normal Insight/Judgement] : insight and judgment were intact

## 2022-02-01 NOTE — HEALTH RISK ASSESSMENT
[Good] : ~his/her~  mood as  good [Never (0 pts)] : Never (0 points) [No] : In the past 12 months have you used drugs other than those required for medical reasons? No [No falls in past year] : Patient reported no falls in the past year [0] : 2) Feeling down, depressed, or hopeless: Not at all (0) [Patient reported mammogram was normal] : Patient reported mammogram was normal [Patient reported PAP Smear was normal] : Patient reported PAP Smear was normal [Patient reported bone density results were abnormal] : Patient reported bone density results were abnormal [HIV test declined] : HIV test declined [Hepatitis C test declined] : Hepatitis C test declined [With Significant Other] : lives with significant other [Feels Safe at Home] : Feels safe at home [Fully functional (bathing, dressing, toileting, transferring, walking, feeding)] : Fully functional (bathing, dressing, toileting, transferring, walking, feeding) [Fully functional (using the telephone, shopping, preparing meals, housekeeping, doing laundry, using] : Fully functional and needs no help or supervision to perform IADLs (using the telephone, shopping, preparing meals, housekeeping, doing laundry, using transportation, managing medications and managing finances) [Smoke Detector] : smoke detector [Carbon Monoxide Detector] : carbon monoxide detector [Seat Belt] :  uses seat belt [Sunscreen] : uses sunscreen [Former] : Former [PHQ-2 Negative - No further assessment needed] : PHQ-2 Negative - No further assessment needed [Audit-CScore] : 0 [de-identified] : 0 [[id number]] : 0 [Change in mental status noted] : No change in mental status noted [None] : None [Employed] : employed [] :  [High Risk Behavior] : no high risk behavior [Reports changes in hearing] : Reports no changes in hearing [Reports changes in vision] : Reports no changes in vision [Reports changes in dental health] : Reports no changes in dental health [Guns at Home] : no guns at home [MammogramDate] : 10/18 [PapSmearDate] : 01/18 [BoneDensityDate] : 04/21 [BoneDensityComments] : osteoporosis [ColonoscopyDate] : 11/19 [ColonoscopyComments] : FIT test [de-identified] :  [de-identified] : wears glasses.  seen ophth every 6 mo [de-identified] : seen dentist 2021 [With Patient/Caregiver] : , with patient/caregiver [Designated Healthcare Proxy] : Designated healthcare proxy [Relationship: ___] : Relationship: [unfilled] [AdvancecareDate] : 02/22

## 2022-02-01 NOTE — PLAN
[FreeTextEntry1] : avoid cheese\par rec pt f/u w GYN since she has family hx of ovarian CA\par pt refused colonoscopy, cologuard\par EKG- sinus lyudmila 54

## 2022-02-02 LAB
25(OH)D3 SERPL-MCNC: 46.3 NG/ML
ALBUMIN SERPL ELPH-MCNC: 4.2 G/DL
ALP BLD-CCNC: 56 U/L
ALT SERPL-CCNC: 18 U/L
ANION GAP SERPL CALC-SCNC: 10 MMOL/L
AST SERPL-CCNC: 31 U/L
BASOPHILS # BLD AUTO: 0.03 K/UL
BASOPHILS NFR BLD AUTO: 0.6 %
BILIRUB SERPL-MCNC: 0.6 MG/DL
BUN SERPL-MCNC: 16 MG/DL
CALCIUM SERPL-MCNC: 9.5 MG/DL
CHLORIDE SERPL-SCNC: 103 MMOL/L
CHOLEST SERPL-MCNC: 199 MG/DL
CO2 SERPL-SCNC: 26 MMOL/L
CREAT SERPL-MCNC: 0.93 MG/DL
EOSINOPHIL # BLD AUTO: 0.06 K/UL
EOSINOPHIL NFR BLD AUTO: 1.2 %
GLUCOSE SERPL-MCNC: 91 MG/DL
HCT VFR BLD CALC: 43.9 %
HDLC SERPL-MCNC: 81 MG/DL
HGB BLD-MCNC: 14.4 G/DL
IMM GRANULOCYTES NFR BLD AUTO: 0.2 %
LDLC SERPL CALC-MCNC: 109 MG/DL
LDLC SERPL DIRECT ASSAY-MCNC: 111 MG/DL
LYMPHOCYTES # BLD AUTO: 1.28 K/UL
LYMPHOCYTES NFR BLD AUTO: 25.7 %
MAN DIFF?: NORMAL
MCHC RBC-ENTMCNC: 29.7 PG
MCHC RBC-ENTMCNC: 32.8 GM/DL
MCV RBC AUTO: 90.5 FL
MONOCYTES # BLD AUTO: 0.43 K/UL
MONOCYTES NFR BLD AUTO: 8.6 %
NEUTROPHILS # BLD AUTO: 3.17 K/UL
NEUTROPHILS NFR BLD AUTO: 63.7 %
NONHDLC SERPL-MCNC: 119 MG/DL
PLATELET # BLD AUTO: 147 K/UL
POTASSIUM SERPL-SCNC: 4.9 MMOL/L
PROT SERPL-MCNC: 6.9 G/DL
RBC # BLD: 4.85 M/UL
RBC # FLD: 12.3 %
SODIUM SERPL-SCNC: 139 MMOL/L
T4 FREE SERPL-MCNC: 1.4 NG/DL
TRIGL SERPL-MCNC: 47 MG/DL
TSH SERPL-ACNC: 0.93 UIU/ML
WBC # FLD AUTO: 4.98 K/UL

## 2022-02-21 LAB — HEMOCCULT STL QL IA: NEGATIVE

## 2022-04-28 ENCOUNTER — RX RENEWAL (OUTPATIENT)
Age: 72
End: 2022-04-28

## 2022-06-09 RX ORDER — AZELASTINE HYDROCHLORIDE 137 UG/1
0.1 SPRAY, METERED NASAL DAILY
Qty: 30 | Refills: 1 | Status: ACTIVE | COMMUNITY
Start: 2021-01-25 | End: 1900-01-01

## 2022-09-09 NOTE — DISCUSSION/SUMMARY
[de-identified] : Diagnosis\par Medication Rx?\par Physical Therapy ordered?\par MRI ordered?\par Brace/splint/cast/crutches given?\par Cortisone injection or aspiration done?\par Pain management referral?\par Follow up with  ?\par All options discussed with patient.\par All questions by patient answered to their satisfaction.\par Patient expresses full understanding and agreement with plan.\par Patient is happy with the office visit.

## 2022-09-09 NOTE — PHYSICAL EXAM
[de-identified] : Left Ankle/Foot: No obvious deformities, atrophy, ecchymosis, or swelling/effusion. Negative tenderness to distal tibia/fibula, medial/lateral malleolus, syndesmotic ligaments, Achilles tendon, anterior talofibular and deltoid ligaments, metatarsals, phalanges, MTP joints, PIP joints, and DIP joints. Normal active and passive range of motion, including dorsiflexion/plantarflexion to 60 degrees and inversion/eversion to 45 degrees. Negative Anterior Drawer, Rand's, Syndesmotic Squeeze, and Plantar Fasciitis tests. Neurovascular status is intact.\par \par Otherwise, no apparent distress. Alert and oriented x 3. Normal mood and affect. No atrophy or swelling. 5 out of 5 motor strength. Sensation is intact and symmetrical. Normal deep tendon reflexes. Full range of motion of shoulder, elbows, hips, and knees (flexion, extension, and rotation). No palpatory tenderness or palpable lymph nodes. Negative straight leg raise. Normal finger-to-nose test. No pathological reflexes. Normal ambulation. No upper or lower extremity instability. [de-identified] : # views of ? are negative for any obvious fractures or dislocations. The patient understands that this is a wet read and I am not a radiologist. If the final read reveals something different than discussed in the office, then the patient will get a call back in the next 24 - 48 hours to discuss.

## 2022-09-09 NOTE — HISTORY OF PRESENT ILLNESS
Problem: Breathing Pattern Ineffective  Goal: Air exchange is effective, demonstrated by Sp02 sat of greater then or = 92% (or as ordered)  Outcome: Outcome Not Met, Continue to Monitor  Goal: Respiratory pattern is quiet and regular without report of SOB  Outcome: Outcome Not Met, Continue to Monitor  Goal: Breathing pattern demonstrates minimal apnea during sleep with appropriate use of airway pressure support devices  Outcome: Outcome Not Met, Continue to Monitor  Goal: Verbalizes/demonstrates effective breathing management strategies  Description: Document education using the patient education activity.   Outcome: Outcome Not Met, Continue to Monitor  Goal: Minimize respiratory effort related to dyspnea/shortness of breath (Hospice)  Outcome: Outcome Not Met, Continue to Monitor     Problem: Pneumonia  Goal: S/S of acute pneumonia are resolved  Description: If acute pneumonia is present, monitor for resolution of fever, cough, secretions and other test values based on presentation.  Outcome: Outcome Not Met, Continue to Monitor  Goal: Verbalizes understanding of pneumonia, treatment, and ongoing prevention  Description: Document on Patient Education Activity  Outcome: Outcome Not Met, Continue to Monitor      [de-identified] : Ms. SHEREE CORREIA is a 72 year female who presents to office complaining of left ankle pain.\par \par All review of systems, family history, social history, surgical history, past medical history, medications, and allergies not previously stated as positive are negative. They were reviewed by me today with the patient and documented accordingly.

## 2022-09-10 ENCOUNTER — APPOINTMENT (OUTPATIENT)
Dept: ORTHOPEDIC SURGERY | Facility: CLINIC | Age: 72
End: 2022-09-10

## 2022-09-19 ENCOUNTER — APPOINTMENT (OUTPATIENT)
Dept: ORTHOPEDIC SURGERY | Facility: CLINIC | Age: 72
End: 2022-09-19

## 2022-12-11 ENCOUNTER — OUTPATIENT (OUTPATIENT)
Dept: OUTPATIENT SERVICES | Facility: HOSPITAL | Age: 72
LOS: 1 days | End: 2022-12-11
Payer: MEDICARE

## 2022-12-11 ENCOUNTER — RESULT REVIEW (OUTPATIENT)
Age: 72
End: 2022-12-11

## 2022-12-11 ENCOUNTER — APPOINTMENT (OUTPATIENT)
Dept: MRI IMAGING | Facility: CLINIC | Age: 72
End: 2022-12-11

## 2022-12-11 DIAGNOSIS — Z00.8 ENCOUNTER FOR OTHER GENERAL EXAMINATION: ICD-10-CM

## 2022-12-11 PROCEDURE — 73721 MRI JNT OF LWR EXTRE W/O DYE: CPT | Mod: 26,LT

## 2022-12-11 PROCEDURE — 73721 MRI JNT OF LWR EXTRE W/O DYE: CPT

## 2023-01-02 ENCOUNTER — RX RENEWAL (OUTPATIENT)
Age: 73
End: 2023-01-02

## 2023-01-16 ENCOUNTER — RX RENEWAL (OUTPATIENT)
Age: 73
End: 2023-01-16

## 2023-02-13 ENCOUNTER — APPOINTMENT (OUTPATIENT)
Dept: INTERNAL MEDICINE | Facility: CLINIC | Age: 73
End: 2023-02-13

## 2023-02-24 ENCOUNTER — NON-APPOINTMENT (OUTPATIENT)
Age: 73
End: 2023-02-24

## 2023-02-24 ENCOUNTER — APPOINTMENT (OUTPATIENT)
Dept: INTERNAL MEDICINE | Facility: CLINIC | Age: 73
End: 2023-02-24
Payer: MEDICARE

## 2023-02-24 VITALS
HEIGHT: 61 IN | WEIGHT: 138 LBS | SYSTOLIC BLOOD PRESSURE: 116 MMHG | HEART RATE: 86 BPM | DIASTOLIC BLOOD PRESSURE: 70 MMHG | BODY MASS INDEX: 26.06 KG/M2 | OXYGEN SATURATION: 97 %

## 2023-02-24 DIAGNOSIS — R51.9 HEADACHE, UNSPECIFIED: ICD-10-CM

## 2023-02-24 DIAGNOSIS — L80 VITILIGO: ICD-10-CM

## 2023-02-24 DIAGNOSIS — Z12.31 ENCOUNTER FOR SCREENING MAMMOGRAM FOR MALIGNANT NEOPLASM OF BREAST: ICD-10-CM

## 2023-02-24 DIAGNOSIS — Z87.891 PERSONAL HISTORY OF NICOTINE DEPENDENCE: ICD-10-CM

## 2023-02-24 DIAGNOSIS — E73.9 LACTOSE INTOLERANCE, UNSPECIFIED: ICD-10-CM

## 2023-02-24 DIAGNOSIS — R01.1 CARDIAC MURMUR, UNSPECIFIED: ICD-10-CM

## 2023-02-24 DIAGNOSIS — Z12.11 ENCOUNTER FOR SCREENING FOR MALIGNANT NEOPLASM OF COLON: ICD-10-CM

## 2023-02-24 PROCEDURE — 36415 COLL VENOUS BLD VENIPUNCTURE: CPT

## 2023-02-24 PROCEDURE — G0439: CPT

## 2023-02-24 PROCEDURE — 99214 OFFICE O/P EST MOD 30 MIN: CPT | Mod: 25

## 2023-02-24 PROCEDURE — 93000 ELECTROCARDIOGRAM COMPLETE: CPT

## 2023-02-24 RX ORDER — FLUTICASONE PROPIONATE 50 UG/1
50 SPRAY, METERED NASAL DAILY
Qty: 16 | Refills: 0 | Status: DISCONTINUED | COMMUNITY
Start: 2021-11-22 | End: 2023-02-24

## 2023-02-24 NOTE — PHYSICAL EXAM
[No Acute Distress] : no acute distress [Well Nourished] : well nourished [Well Developed] : well developed [Well-Appearing] : well-appearing [Normal Sclera/Conjunctiva] : normal sclera/conjunctiva [PERRL] : pupils equal round and reactive to light [Normal Outer Ear/Nose] : the outer ears and nose were normal in appearance [Normal TMs] : both tympanic membranes were normal [No Lymphadenopathy] : no lymphadenopathy [Supple] : supple [Thyroid Normal, No Nodules] : the thyroid was normal and there were no nodules present [No Respiratory Distress] : no respiratory distress  [No Accessory Muscle Use] : no accessory muscle use [Clear to Auscultation] : lungs were clear to auscultation bilaterally [Normal Rate] : normal rate  [Regular Rhythm] : with a regular rhythm [Normal S1, S2] : normal S1 and S2 [No Carotid Bruits] : no carotid bruits [Pedal Pulses Present] : the pedal pulses are present [No Edema] : there was no peripheral edema [Normal Appearance] : normal in appearance [No Axillary Lymphadenopathy] : no axillary lymphadenopathy [Soft] : abdomen soft [Non Tender] : non-tender [Non-distended] : non-distended [No Masses] : no abdominal mass palpated [Normal Bowel Sounds] : normal bowel sounds [Grossly Normal Strength/Tone] : grossly normal strength/tone [No Focal Deficits] : no focal deficits [Normal Gait] : normal gait [Normal Affect] : the affect was normal [Normal Insight/Judgement] : insight and judgment were intact [de-identified] : murmur [de-identified] : hypopigmentation on neck

## 2023-02-24 NOTE — HISTORY OF PRESENT ILLNESS
[FreeTextEntry1] : CPE [de-identified] : vaccine- pneum , shingrix, flu vac - pt refused\par diet- healthy diet reviewed- eating less sugar, carbs\par exercise- no due to ankle injury- prev walking 30 min\par phelgm- only in AM. no cough, or SOB.- w cold weather. started 1 yr ago \par chg in bm- has loose bm on Sunday AM- using milk.  pt has been non compliant w lactose free diet\par nervous- occasional .  not anxiety as per pt\par review 9/10/22 Ortho notes- L ankle pain- reviewed 12/11/22 MRI- post tibial tendon tear- now seeing private Ortho- got PT- wearing brace\par lactose intolerance- gets bloating w dairy- no diarrhea.  eating kosher dairy.  no abd bloating- pt still eating cheese, yogart\par Osteoporosis-on fosamax\par lumbar radiculopathy- / neuropathy- rarely bothers her.\par states was dx w vitiligo 15 yr ago\par burning feet- on gabapentin- using in 3-4 /wk\par chol- compliant w meds\par \par

## 2023-02-24 NOTE — HEALTH RISK ASSESSMENT
[Good] : ~his/her~  mood as  good [Never (0 pts)] : Never (0 points) [No] : In the past 12 months have you used drugs other than those required for medical reasons? No [No falls in past year] : Patient reported no falls in the past year [0] : 2) Feeling down, depressed, or hopeless: Not at all (0) [PHQ-2 Negative - No further assessment needed] : PHQ-2 Negative - No further assessment needed [Patient reported mammogram was normal] : Patient reported mammogram was normal [Patient reported PAP Smear was normal] : Patient reported PAP Smear was normal [Patient reported bone density results were abnormal] : Patient reported bone density results were abnormal [HIV test declined] : HIV test declined [Hepatitis C test declined] : Hepatitis C test declined [None] : None [With Significant Other] : lives with significant other [Employed] : employed [] :  [Feels Safe at Home] : Feels safe at home [Fully functional (bathing, dressing, toileting, transferring, walking, feeding)] : Fully functional (bathing, dressing, toileting, transferring, walking, feeding) [Fully functional (using the telephone, shopping, preparing meals, housekeeping, doing laundry, using] : Fully functional and needs no help or supervision to perform IADLs (using the telephone, shopping, preparing meals, housekeeping, doing laundry, using transportation, managing medications and managing finances) [Smoke Detector] : smoke detector [Carbon Monoxide Detector] : carbon monoxide detector [Seat Belt] :  uses seat belt [Sunscreen] : uses sunscreen [With Patient/Caregiver] : , with patient/caregiver [Designated Healthcare Proxy] : Designated healthcare proxy [Relationship: ___] : Relationship: [unfilled] [Audit-CScore] : 0 [de-identified] : 0 [[id number]] : 0 [Change in mental status noted] : No change in mental status noted [High Risk Behavior] : no high risk behavior [Reports changes in hearing] : Reports no changes in hearing [Reports changes in vision] : Reports no changes in vision [Reports changes in dental health] : Reports no changes in dental health [Guns at Home] : no guns at home [MammogramDate] : 10/18 [MammogramComments] : p [PapSmearDate] : 01/18 [BoneDensityDate] : 04/21 [BoneDensityComments] : osteoporosis [ColonoscopyDate] : 02/22 [ColonoscopyComments] : FIT test- neg [de-identified] :  [de-identified] : [facility] [de-identified] : wears glasses.  seen ophth every 6 mo- last seen Oct 2022 [de-identified] : seen dentist  1wk [AdvancecareDate] : 02/23

## 2023-02-24 NOTE — PLAN
[FreeTextEntry1] : osteoporosis- stop fosamax for drug holiday\par lactose intolerance- dietary chg discussed- if loose stool is still an issue, pt will f/u\par loose bm only on Sun- rec colonoscopy- pt refused.  rec cologuard\par pt refusing skin CA screening\par EKG\par refused cologuard, colonoscopy.  only willing to do FIT test\par refused all vaccinesEKG NSR 76

## 2023-02-27 DIAGNOSIS — R30.0 DYSURIA: ICD-10-CM

## 2023-02-27 LAB
25(OH)D3 SERPL-MCNC: 41.8 NG/ML
ALBUMIN SERPL ELPH-MCNC: 4.3 G/DL
ALP BLD-CCNC: 51 U/L
ALT SERPL-CCNC: 20 U/L
ANION GAP SERPL CALC-SCNC: 10 MMOL/L
APPEARANCE: ABNORMAL
AST SERPL-CCNC: 28 U/L
BACTERIA: NEGATIVE
BASOPHILS # BLD AUTO: 0.03 K/UL
BASOPHILS NFR BLD AUTO: 0.5 %
BILIRUB SERPL-MCNC: 0.5 MG/DL
BILIRUBIN URINE: NEGATIVE
BLOOD URINE: NEGATIVE
BUN SERPL-MCNC: 16 MG/DL
CALCIUM SERPL-MCNC: 9.7 MG/DL
CHLORIDE SERPL-SCNC: 105 MMOL/L
CHOLEST SERPL-MCNC: 161 MG/DL
CO2 SERPL-SCNC: 25 MMOL/L
COLOR: YELLOW
CREAT SERPL-MCNC: 0.96 MG/DL
EGFR: 62 ML/MIN/1.73M2
EOSINOPHIL # BLD AUTO: 0.07 K/UL
EOSINOPHIL NFR BLD AUTO: 1.1 %
ESTIMATED AVERAGE GLUCOSE: 114 MG/DL
GLUCOSE QUALITATIVE U: NEGATIVE
GLUCOSE SERPL-MCNC: 104 MG/DL
HBA1C MFR BLD HPLC: 5.6 %
HCT VFR BLD CALC: 46.9 %
HDLC SERPL-MCNC: 77 MG/DL
HGB BLD-MCNC: 15.2 G/DL
HYALINE CASTS: 2 /LPF
IMM GRANULOCYTES NFR BLD AUTO: 0.3 %
KETONES URINE: NEGATIVE
LDLC SERPL CALC-MCNC: 72 MG/DL
LDLC SERPL DIRECT ASSAY-MCNC: 70 MG/DL
LEUKOCYTE ESTERASE URINE: ABNORMAL
LYMPHOCYTES # BLD AUTO: 1.71 K/UL
LYMPHOCYTES NFR BLD AUTO: 27.6 %
MAN DIFF?: NORMAL
MCHC RBC-ENTMCNC: 30.3 PG
MCHC RBC-ENTMCNC: 32.4 GM/DL
MCV RBC AUTO: 93.6 FL
MICROSCOPIC-UA: NORMAL
MONOCYTES # BLD AUTO: 0.46 K/UL
MONOCYTES NFR BLD AUTO: 7.4 %
NEUTROPHILS # BLD AUTO: 3.91 K/UL
NEUTROPHILS NFR BLD AUTO: 63.1 %
NITRITE URINE: NEGATIVE
NONHDLC SERPL-MCNC: 84 MG/DL
PH URINE: 5.5
PLATELET # BLD AUTO: 150 K/UL
POTASSIUM SERPL-SCNC: 5.3 MMOL/L
PROT SERPL-MCNC: 6.7 G/DL
PROTEIN URINE: NORMAL
RBC # BLD: 5.01 M/UL
RBC # FLD: 12.5 %
RED BLOOD CELLS URINE: 4 /HPF
SODIUM SERPL-SCNC: 140 MMOL/L
SPECIFIC GRAVITY URINE: 1.03
SQUAMOUS EPITHELIAL CELLS: 13 /HPF
T4 FREE SERPL-MCNC: 1.4 NG/DL
TRIGL SERPL-MCNC: 57 MG/DL
TSH SERPL-ACNC: 1.02 UIU/ML
UROBILINOGEN URINE: NORMAL
VIT B12 SERPL-MCNC: 712 PG/ML
WBC # FLD AUTO: 6.2 K/UL
WHITE BLOOD CELLS URINE: 15 /HPF

## 2023-03-04 ENCOUNTER — LABORATORY RESULT (OUTPATIENT)
Age: 73
End: 2023-03-04

## 2023-03-06 LAB
APPEARANCE: CLEAR
BILIRUBIN URINE: NEGATIVE
BLOOD URINE: NEGATIVE
COLOR: YELLOW
GLUCOSE QUALITATIVE U: NEGATIVE
KETONES URINE: NORMAL
LEUKOCYTE ESTERASE URINE: ABNORMAL
NITRITE URINE: NEGATIVE
PH URINE: 5.5
PROTEIN URINE: NORMAL
SPECIFIC GRAVITY URINE: 1.02
UROBILINOGEN URINE: NORMAL

## 2023-03-28 ENCOUNTER — APPOINTMENT (OUTPATIENT)
Dept: CARDIOLOGY | Facility: CLINIC | Age: 73
End: 2023-03-28
Payer: MEDICARE

## 2023-03-28 VITALS
BODY MASS INDEX: 26.08 KG/M2 | DIASTOLIC BLOOD PRESSURE: 80 MMHG | SYSTOLIC BLOOD PRESSURE: 124 MMHG | HEART RATE: 66 BPM | OXYGEN SATURATION: 100 % | WEIGHT: 138 LBS

## 2023-03-28 DIAGNOSIS — R94.31 ABNORMAL ELECTROCARDIOGRAM [ECG] [EKG]: ICD-10-CM

## 2023-03-28 DIAGNOSIS — R09.89 OTHER SPECIFIED SYMPTOMS AND SIGNS INVOLVING THE CIRCULATORY AND RESPIRATORY SYSTEMS: ICD-10-CM

## 2023-03-28 DIAGNOSIS — Z87.39 PERSONAL HISTORY OF OTHER DISEASES OF THE MUSCULOSKELETAL SYSTEM AND CONNECTIVE TISSUE: ICD-10-CM

## 2023-03-28 PROCEDURE — 99204 OFFICE O/P NEW MOD 45 MIN: CPT

## 2023-03-28 NOTE — DISCUSSION/SUMMARY
[FreeTextEntry1] :  at this point I see no reason for any further cardiac work-up.  I did not appreciate any heart murmur after careful exam and various maneuvers.\par \par   I reassured her that her cardiac status was stable\par  she should continue to be active and exercise.

## 2023-03-28 NOTE — PHYSICAL EXAM
[Well Developed] : well developed [Well Nourished] : well nourished [Normal Conjunctiva] : normal conjunctiva [No Carotid Bruit] : no carotid bruit [Normal S1, S2] : normal S1, S2 [Clear Lung Fields] : clear lung fields [Good Air Entry] : good air entry [No Respiratory Distress] : no respiratory distress  [Soft] : abdomen soft [Non Tender] : non-tender [No Edema] : no edema [Normal DP B/L] : normal DP B/L [Moves all extremities] : moves all extremities [Alert and Oriented] : alert and oriented [de-identified] :  no acute distress  looks [de-identified] :  S1-S2 no murmur even with provocative maneuvers such as handgrip and Valsalva

## 2023-03-28 NOTE — HISTORY OF PRESENT ILLNESS
[FreeTextEntry1] : Stacey  is a healthy 72-year-old without diabetes or hypertension.  She was a previous smoker many years ago.  She has a history of mixed hyperlipidemia on a statin.  Her last blood test revealed an LDL of 72 triglycerides 57 cholesterol 161 and she had a hemoglobin A1c of 5.6\par \par  she walks frequently without chest pain chest pressure shortness of breath.  Recently she has been limited in the walking because of an ankle injury.  Prior to the ankle injury she was able to walk without chest pain or palpitations dizziness or syncope.  She denies edema orthopnea PND\par \par  EKG February 2023 normal sinus rhythm within normal limits (I reviewed the EKG)\par \par  the patient has had no significant hospitalizations and remains on stable medication

## 2023-03-28 NOTE — ASSESSMENT
[FreeTextEntry1] : Stacey Kerr  is an overall healthy 72-year-old with a history of some mild hyperlipidemia well-controlled on statin, no other significant major medical issues, no chest pain or chest pressure, good exercise tolerance with a normal EKG normal physical exam.  She has no heart murmur even with provocative maneuvers.  She is overall healthy and in overall good condition\par \par

## 2023-03-28 NOTE — REASON FOR VISIT
[FreeTextEntry1] : Stacey Kerr  72 years old here for evaluation of her cardiac status and to evaluate for possible heart murmur

## 2023-05-18 ENCOUNTER — RESULT REVIEW (OUTPATIENT)
Age: 73
End: 2023-05-18

## 2023-05-18 ENCOUNTER — APPOINTMENT (OUTPATIENT)
Dept: ULTRASOUND IMAGING | Facility: CLINIC | Age: 73
End: 2023-05-18
Payer: MEDICARE

## 2023-05-18 ENCOUNTER — APPOINTMENT (OUTPATIENT)
Dept: MAMMOGRAPHY | Facility: CLINIC | Age: 73
End: 2023-05-18
Payer: MEDICARE

## 2023-05-18 PROCEDURE — 77063 BREAST TOMOSYNTHESIS BI: CPT

## 2023-05-18 PROCEDURE — 77067 SCR MAMMO BI INCL CAD: CPT

## 2023-05-18 PROCEDURE — 76641 ULTRASOUND BREAST COMPLETE: CPT | Mod: 50

## 2023-06-06 ENCOUNTER — APPOINTMENT (OUTPATIENT)
Dept: RADIOLOGY | Facility: CLINIC | Age: 73
End: 2023-06-06

## 2023-07-17 ENCOUNTER — APPOINTMENT (OUTPATIENT)
Dept: DERMATOLOGY | Facility: CLINIC | Age: 73
End: 2023-07-17
Payer: MEDICARE

## 2023-07-17 DIAGNOSIS — C44.519 BASAL CELL CARCINOMA OF SKIN OF OTHER PART OF TRUNK: ICD-10-CM

## 2023-07-17 PROCEDURE — 99203 OFFICE O/P NEW LOW 30 MIN: CPT | Mod: 25

## 2023-08-03 ENCOUNTER — APPOINTMENT (OUTPATIENT)
Dept: DERMATOLOGY | Facility: CLINIC | Age: 73
End: 2023-08-03
Payer: MEDICARE

## 2023-08-03 ENCOUNTER — NON-APPOINTMENT (OUTPATIENT)
Age: 73
End: 2023-08-03

## 2023-08-03 DIAGNOSIS — D48.5 NEOPLASM OF UNCERTAIN BEHAVIOR OF SKIN: ICD-10-CM

## 2023-08-03 DIAGNOSIS — C44.91 BASAL CELL CARCINOMA OF SKIN, UNSPECIFIED: ICD-10-CM

## 2023-08-03 PROCEDURE — 12032 INTMD RPR S/A/T/EXT 2.6-7.5: CPT

## 2023-08-03 PROCEDURE — 11603 EXC TR-EXT MAL+MARG 2.1-3 CM: CPT

## 2023-08-04 PROBLEM — D48.5 NEOPLASM OF UNCERTAIN BEHAVIOR OF SKIN: Status: ACTIVE | Noted: 2023-08-03

## 2023-08-04 PROBLEM — C44.91 BASAL CELL CARCINOMA: Status: ACTIVE | Noted: 2023-05-18

## 2023-08-07 ENCOUNTER — NON-APPOINTMENT (OUTPATIENT)
Age: 73
End: 2023-08-07

## 2023-08-15 LAB — DERMATOLOGY BIOPSY: NORMAL

## 2023-09-01 ENCOUNTER — APPOINTMENT (OUTPATIENT)
Dept: INTERNAL MEDICINE | Facility: CLINIC | Age: 73
End: 2023-09-01
Payer: MEDICARE

## 2023-09-01 VITALS
WEIGHT: 142 LBS | HEART RATE: 62 BPM | SYSTOLIC BLOOD PRESSURE: 120 MMHG | BODY MASS INDEX: 26.81 KG/M2 | TEMPERATURE: 97 F | OXYGEN SATURATION: 98 % | HEIGHT: 61 IN | DIASTOLIC BLOOD PRESSURE: 80 MMHG

## 2023-09-01 DIAGNOSIS — G89.29 PAIN IN UNSPECIFIED ANKLE AND JOINTS OF UNSPECIFIED FOOT: ICD-10-CM

## 2023-09-01 DIAGNOSIS — M25.579 PAIN IN UNSPECIFIED ANKLE AND JOINTS OF UNSPECIFIED FOOT: ICD-10-CM

## 2023-09-01 PROCEDURE — 36415 COLL VENOUS BLD VENIPUNCTURE: CPT

## 2023-09-01 PROCEDURE — 99214 OFFICE O/P EST MOD 30 MIN: CPT | Mod: 25

## 2023-09-01 RX ORDER — ALENDRONATE SODIUM 70 MG/1
70 TABLET ORAL
Qty: 15 | Refills: 1 | Status: ACTIVE | OUTPATIENT
Start: 2021-04-16

## 2023-09-01 RX ORDER — CEPHALEXIN 500 MG/1
500 CAPSULE ORAL
Qty: 21 | Refills: 0 | Status: DISCONTINUED | COMMUNITY
Start: 2023-08-03 | End: 2023-09-01

## 2023-09-01 NOTE — PHYSICAL EXAM
[Normal Supraclavicular Nodes] : no supraclavicular lymphadenopathy [Normal Axillary Nodes] : no axillary lymphadenopathy [Normal Posterior Cervical Nodes] : no posterior cervical lymphadenopathy [Normal Anterior Cervical Nodes] : no anterior cervical lymphadenopathy [Normal Inguinal Nodes] : no inguinal lymphadenopathy [de-identified] : surgical site under R breast - healing , no erythema [TextEntry] : Constitutional: no acute distress, well nourished, well developed and well-appearing. Pulmonary: no respiratory distress, lungs were clear to auscultation bilaterally, no accessory muscle use. Cardiac: normal rate, with a regular rhythm, normal S1 and S2 and no murmur heard.  Vascular: there was no peripheral edema. Abdomen: abdomen soft, non-tender, non-distended, no abdominal mass palpated and normal bowel sounds. Psychiatric: the affect was normal and insight and judgement were intact

## 2023-09-01 NOTE — HISTORY OF PRESENT ILLNESS
[FreeTextEntry1] : f/u testing , [facility] [de-identified] : v [facility]- reviewed 8/3/23 Derm notes reviewed 3/1/23 FIT - neg. reviewed 5/18/23 mammo/ US - nl chg in bm- has loose bm- resolved  pt has been non compliant w lactose free diet nervous- occasional .  not anxiety as per pt review 9/10/22 Ortho notes- L ankle pain- reviewed 12/11/22 MRI- post tibial tendon tear- now seeing private Ortho- got PT- wearing brace lactose intolerance- gets bloating w dairy- no diarrhea.  eating kosher dairy.  no abd bloating- pt still eating cheese, yogart Osteoporosis-on fosamax- didn't do the DEXA states was dx w vitiligo 15 yr ago burning feet- on gabapentin- using in 3-4 /wk chol- compliant w meds - ECHO-reviewed 3/28/23 notes from Dr. Ian Nieves, Cardiology- no murmur on exam c/o fatigue for 10 days- incr stress,  didn't take a vacation this summer, not sleeping well 1 yr ago  sprained her ankle- still having issues when she walks

## 2023-09-03 LAB
25(OH)D3 SERPL-MCNC: 41.8 NG/ML
ALBUMIN SERPL ELPH-MCNC: 4.5 G/DL
ALP BLD-CCNC: 53 U/L
ALT SERPL-CCNC: 19 U/L
ANION GAP SERPL CALC-SCNC: 14 MMOL/L
AST SERPL-CCNC: 27 U/L
BASOPHILS # BLD AUTO: 0.03 K/UL
BASOPHILS NFR BLD AUTO: 0.5 %
BILIRUB SERPL-MCNC: 0.3 MG/DL
BUN SERPL-MCNC: 20 MG/DL
CALCIUM SERPL-MCNC: 9.8 MG/DL
CHLORIDE SERPL-SCNC: 102 MMOL/L
CHOLEST SERPL-MCNC: 160 MG/DL
CO2 SERPL-SCNC: 22 MMOL/L
CREAT SERPL-MCNC: 0.9 MG/DL
EGFR: 67 ML/MIN/1.73M2
EOSINOPHIL # BLD AUTO: 0.07 K/UL
EOSINOPHIL NFR BLD AUTO: 1.2 %
GLUCOSE SERPL-MCNC: 93 MG/DL
HCT VFR BLD CALC: 45 %
HDLC SERPL-MCNC: 68 MG/DL
HGB BLD-MCNC: 15.1 G/DL
IMM GRANULOCYTES NFR BLD AUTO: 0.2 %
LDLC SERPL CALC-MCNC: 77 MG/DL
LYMPHOCYTES # BLD AUTO: 2.17 K/UL
LYMPHOCYTES NFR BLD AUTO: 36.2 %
MAN DIFF?: NORMAL
MCHC RBC-ENTMCNC: 31 PG
MCHC RBC-ENTMCNC: 33.6 GM/DL
MCV RBC AUTO: 92.4 FL
MONOCYTES # BLD AUTO: 0.5 K/UL
MONOCYTES NFR BLD AUTO: 8.3 %
NEUTROPHILS # BLD AUTO: 3.21 K/UL
NEUTROPHILS NFR BLD AUTO: 53.6 %
NONHDLC SERPL-MCNC: 91 MG/DL
PLATELET # BLD AUTO: 134 K/UL
POTASSIUM SERPL-SCNC: 4.8 MMOL/L
PROT SERPL-MCNC: 7.1 G/DL
RBC # BLD: 4.87 M/UL
RBC # FLD: 12.8 %
SODIUM SERPL-SCNC: 138 MMOL/L
T4 FREE SERPL-MCNC: 1.4 NG/DL
TRIGL SERPL-MCNC: 73 MG/DL
TSH SERPL-ACNC: 0.73 UIU/ML
WBC # FLD AUTO: 5.99 K/UL

## 2024-02-21 ENCOUNTER — APPOINTMENT (OUTPATIENT)
Dept: INTERNAL MEDICINE | Facility: CLINIC | Age: 74
End: 2024-02-21

## 2024-02-23 RX ORDER — ATORVASTATIN CALCIUM 10 MG/1
10 TABLET, FILM COATED ORAL DAILY
Qty: 90 | Refills: 3 | Status: ACTIVE | COMMUNITY
Start: 2021-01-26 | End: 1900-01-01

## 2024-03-09 ENCOUNTER — APPOINTMENT (OUTPATIENT)
Dept: INTERNAL MEDICINE | Facility: CLINIC | Age: 74
End: 2024-03-09
Payer: MEDICARE

## 2024-03-09 ENCOUNTER — NON-APPOINTMENT (OUTPATIENT)
Age: 74
End: 2024-03-09

## 2024-03-09 VITALS
HEART RATE: 64 BPM | WEIGHT: 138 LBS | BODY MASS INDEX: 26.06 KG/M2 | HEIGHT: 61 IN | OXYGEN SATURATION: 99 % | DIASTOLIC BLOOD PRESSURE: 58 MMHG | SYSTOLIC BLOOD PRESSURE: 98 MMHG

## 2024-03-09 DIAGNOSIS — E66.3 OVERWEIGHT: ICD-10-CM

## 2024-03-09 DIAGNOSIS — R92.30 DENSE BREASTS, UNSPECIFIED: ICD-10-CM

## 2024-03-09 DIAGNOSIS — E78.5 HYPERLIPIDEMIA, UNSPECIFIED: ICD-10-CM

## 2024-03-09 DIAGNOSIS — M25.572 PAIN IN LEFT ANKLE AND JOINTS OF LEFT FOOT: ICD-10-CM

## 2024-03-09 DIAGNOSIS — Z00.00 ENCOUNTER FOR GENERAL ADULT MEDICAL EXAMINATION W/OUT ABNORMAL FINDINGS: ICD-10-CM

## 2024-03-09 DIAGNOSIS — M54.50 LOW BACK PAIN, UNSPECIFIED: ICD-10-CM

## 2024-03-09 DIAGNOSIS — M81.0 AGE-RELATED OSTEOPOROSIS W/OUT CURRENT PATHOLOGICAL FRACTURE: ICD-10-CM

## 2024-03-09 PROCEDURE — 93000 ELECTROCARDIOGRAM COMPLETE: CPT

## 2024-03-09 PROCEDURE — G0439: CPT

## 2024-03-09 PROCEDURE — 36415 COLL VENOUS BLD VENIPUNCTURE: CPT

## 2024-03-09 RX ORDER — FLUTICASONE PROPIONATE 50 UG/1
50 SPRAY, METERED NASAL TWICE DAILY
Qty: 1 | Refills: 6 | Status: ACTIVE | COMMUNITY
Start: 2023-08-25 | End: 1900-01-01

## 2024-03-09 NOTE — PLAN
[FreeTextEntry1] : ECG-SR,NO ACUTE ISCHEMIA PREVENTIVE MEDICINE DISCUSSED TX REFUSED BONE DENSITY AND TREATMENT OF OSTEOPOROSIS REFUSED COLONOSCOPY

## 2024-03-09 NOTE — HEALTH RISK ASSESSMENT
[No] : In the past 12 months have you used drugs other than those required for medical reasons? No [Good] : ~his/her~  mood as  good [No falls in past year] : Patient reported no falls in the past year [0] : 2) Feeling down, depressed, or hopeless: Not at all (0) [de-identified] : WALKING [de-identified] : REGULAR [[id number]] : 0 [Patient reported mammogram was normal] : Patient reported mammogram was normal [Patient reported PAP Smear was normal] : Patient reported PAP Smear was normal [MammogramDate] : 2023 [Patient declined colonoscopy] : Patient declined colonoscopy [PapSmearDate] : 2021 [BoneDensityComments] : OSTEOPOROSIS [BoneDensityDate] : 2021

## 2024-03-09 NOTE — PHYSICAL EXAM
[Well Nourished] : well nourished [No Acute Distress] : no acute distress [Well Developed] : well developed [Well-Appearing] : well-appearing [Normal Sclera/Conjunctiva] : normal sclera/conjunctiva [PERRL] : pupils equal round and reactive to light [EOMI] : extraocular movements intact [Normal Outer Ear/Nose] : the outer ears and nose were normal in appearance [No JVD] : no jugular venous distention [Normal Oropharynx] : the oropharynx was normal [No Lymphadenopathy] : no lymphadenopathy [Supple] : supple [Thyroid Normal, No Nodules] : the thyroid was normal and there were no nodules present [No Respiratory Distress] : no respiratory distress  [No Accessory Muscle Use] : no accessory muscle use [Clear to Auscultation] : lungs were clear to auscultation bilaterally [Regular Rhythm] : with a regular rhythm [Normal Rate] : normal rate  [No Murmur] : no murmur heard [Normal S1, S2] : normal S1 and S2 [No Varicosities] : no varicosities [No Carotid Bruits] : no carotid bruits [No Abdominal Bruit] : a ~M bruit was not heard ~T in the abdomen [No Palpable Aorta] : no palpable aorta [Pedal Pulses Present] : the pedal pulses are present [No Edema] : there was no peripheral edema [Non Tender] : non-tender [Soft] : abdomen soft [No Extremity Clubbing/Cyanosis] : no extremity clubbing/cyanosis [Non-distended] : non-distended [Normal Posterior Cervical Nodes] : no posterior cervical lymphadenopathy [No HSM] : no HSM [Normal Bowel Sounds] : normal bowel sounds [Normal Anterior Cervical Nodes] : no anterior cervical lymphadenopathy [No CVA Tenderness] : no CVA  tenderness [No Spinal Tenderness] : no spinal tenderness [No Joint Swelling] : no joint swelling [Grossly Normal Strength/Tone] : grossly normal strength/tone [Coordination Grossly Intact] : coordination grossly intact [No Rash] : no rash [Normal Gait] : normal gait [Deep Tendon Reflexes (DTR)] : deep tendon reflexes were 2+ and symmetric [No Focal Deficits] : no focal deficits [Normal Affect] : the affect was normal [Normal Insight/Judgement] : insight and judgment were intact [No Masses] : no palpable masses [No Nipple Discharge] : no nipple discharge [Normal Appearance] : normal in appearance [No Axillary Lymphadenopathy] : no axillary lymphadenopathy

## 2024-03-12 LAB
25(OH)D3 SERPL-MCNC: 38.7 NG/ML
ALBUMIN SERPL ELPH-MCNC: 4.4 G/DL
ALP BLD-CCNC: 66 U/L
ALT SERPL-CCNC: 17 U/L
ANION GAP SERPL CALC-SCNC: 14 MMOL/L
APPEARANCE: CLEAR
AST SERPL-CCNC: 27 U/L
BACTERIA: NEGATIVE /HPF
BILIRUB SERPL-MCNC: 0.7 MG/DL
BILIRUBIN URINE: NEGATIVE
BLOOD URINE: NEGATIVE
BUN SERPL-MCNC: 15 MG/DL
CALCIUM SERPL-MCNC: 9.9 MG/DL
CAST: 0 /LPF
CHLORIDE SERPL-SCNC: 103 MMOL/L
CHOLEST SERPL-MCNC: 163 MG/DL
CO2 SERPL-SCNC: 24 MMOL/L
COLOR: YELLOW
CREAT SERPL-MCNC: 0.93 MG/DL
EGFR: 64 ML/MIN/1.73M2
EPITHELIAL CELLS: 4 /HPF
ESTIMATED AVERAGE GLUCOSE: 114 MG/DL
GLUCOSE QUALITATIVE U: NEGATIVE MG/DL
GLUCOSE SERPL-MCNC: 105 MG/DL
HBA1C MFR BLD HPLC: 5.6 %
HCT VFR BLD CALC: 45.9 %
HDLC SERPL-MCNC: 71 MG/DL
HGB BLD-MCNC: 14.8 G/DL
KETONES URINE: NEGATIVE MG/DL
LDLC SERPL CALC-MCNC: 80 MG/DL
LEUKOCYTE ESTERASE URINE: ABNORMAL
MCHC RBC-ENTMCNC: 29.8 PG
MCHC RBC-ENTMCNC: 32.2 GM/DL
MCV RBC AUTO: 92.4 FL
MICROSCOPIC-UA: NORMAL
NITRITE URINE: NEGATIVE
NONHDLC SERPL-MCNC: 92 MG/DL
PH URINE: 6.5
PLATELET # BLD AUTO: 172 K/UL
POTASSIUM SERPL-SCNC: 5.1 MMOL/L
PROT SERPL-MCNC: 7.3 G/DL
PROTEIN URINE: NEGATIVE MG/DL
RBC # BLD: 4.97 M/UL
RBC # FLD: 12.9 %
RED BLOOD CELLS URINE: 1 /HPF
SODIUM SERPL-SCNC: 142 MMOL/L
SPECIFIC GRAVITY URINE: 1.02
T3 SERPL-MCNC: 120 NG/DL
T4 SERPL-MCNC: 8.6 UG/DL
TRIGL SERPL-MCNC: 59 MG/DL
TSH SERPL-ACNC: 1.02 UIU/ML
UROBILINOGEN URINE: 0.2 MG/DL
WBC # FLD AUTO: 4.54 K/UL
WHITE BLOOD CELLS URINE: 1 /HPF

## 2024-06-22 ENCOUNTER — APPOINTMENT (OUTPATIENT)
Dept: INTERNAL MEDICINE | Facility: CLINIC | Age: 74
End: 2024-06-22
Payer: MEDICARE

## 2024-06-22 VITALS
HEART RATE: 85 BPM | BODY MASS INDEX: 25.68 KG/M2 | HEIGHT: 61 IN | OXYGEN SATURATION: 96 % | WEIGHT: 136 LBS | SYSTOLIC BLOOD PRESSURE: 124 MMHG | DIASTOLIC BLOOD PRESSURE: 64 MMHG

## 2024-06-22 DIAGNOSIS — M25.579 PAIN IN UNSPECIFIED ANKLE AND JOINTS OF UNSPECIFIED FOOT: ICD-10-CM

## 2024-06-22 DIAGNOSIS — R26.89 OTHER ABNORMALITIES OF GAIT AND MOBILITY: ICD-10-CM

## 2024-06-22 DIAGNOSIS — R53.83 OTHER FATIGUE: ICD-10-CM

## 2024-06-22 DIAGNOSIS — G62.9 POLYNEUROPATHY, UNSPECIFIED: ICD-10-CM

## 2024-06-22 DIAGNOSIS — R55 SYNCOPE AND COLLAPSE: ICD-10-CM

## 2024-06-22 DIAGNOSIS — E53.8 DEFICIENCY OF OTHER SPECIFIED B GROUP VITAMINS: ICD-10-CM

## 2024-06-22 PROCEDURE — 99213 OFFICE O/P EST LOW 20 MIN: CPT

## 2024-06-22 PROCEDURE — 36415 COLL VENOUS BLD VENIPUNCTURE: CPT

## 2024-06-22 RX ORDER — GABAPENTIN 300 MG/1
300 CAPSULE ORAL
Qty: 90 | Refills: 3 | Status: ACTIVE | COMMUNITY
Start: 2019-01-29 | End: 1900-01-01

## 2024-06-22 NOTE — HISTORY OF PRESENT ILLNESS
[FreeTextEntry8] : Pt reports having 2 episodes of near syncope and impaired balance few months ago. Under a lot of stress. Feeling anxious. No c/p palp.SOB. Appetite is good. Also left ankle pain at times.

## 2024-06-25 LAB
ALBUMIN SERPL ELPH-MCNC: 4.3 G/DL
ALP BLD-CCNC: 67 U/L
ALT SERPL-CCNC: 19 U/L
ANION GAP SERPL CALC-SCNC: 11 MMOL/L
AST SERPL-CCNC: 25 U/L
BILIRUB SERPL-MCNC: 0.7 MG/DL
BUN SERPL-MCNC: 16 MG/DL
CALCIUM SERPL-MCNC: 9.8 MG/DL
CHLORIDE SERPL-SCNC: 106 MMOL/L
CO2 SERPL-SCNC: 24 MMOL/L
CREAT SERPL-MCNC: 0.97 MG/DL
CRP SERPL-MCNC: <3 MG/L
EGFR: 61 ML/MIN/1.73M2
ERYTHROCYTE [SEDIMENTATION RATE] IN BLOOD BY WESTERGREN METHOD: 24 MM/HR
GLUCOSE SERPL-MCNC: 85 MG/DL
POTASSIUM SERPL-SCNC: 4.9 MMOL/L
PROT SERPL-MCNC: 6.6 G/DL
SODIUM SERPL-SCNC: 140 MMOL/L
VIT B12 SERPL-MCNC: 922 PG/ML

## 2024-07-06 ENCOUNTER — APPOINTMENT (OUTPATIENT)
Dept: INTERNAL MEDICINE | Facility: CLINIC | Age: 74
End: 2024-07-06
Payer: MEDICARE

## 2024-07-06 DIAGNOSIS — R30.0 DYSURIA: ICD-10-CM

## 2024-07-06 LAB
BILIRUB UR QL STRIP: NEGATIVE
GLUCOSE UR-MCNC: NEGATIVE
HCG UR QL: 0.2 EU/DL
HGB UR QL STRIP.AUTO: NORMAL
KETONES UR-MCNC: NEGATIVE
LEUKOCYTE ESTERASE UR QL STRIP: NEGATIVE
NITRITE UR QL STRIP: NEGATIVE
PH UR STRIP: 6
PROT UR STRIP-MCNC: NEGATIVE
SP GR UR STRIP: 1.01

## 2024-07-06 PROCEDURE — 99213 OFFICE O/P EST LOW 20 MIN: CPT

## 2024-07-06 PROCEDURE — 81003 URINALYSIS AUTO W/O SCOPE: CPT | Mod: QW

## 2024-07-09 LAB — BACTERIA UR CULT: NORMAL

## 2024-07-21 ENCOUNTER — EMERGENCY (EMERGENCY)
Facility: HOSPITAL | Age: 74
LOS: 1 days | Discharge: HOME | End: 2024-07-21
Payer: MEDICARE

## 2024-07-21 VITALS
TEMPERATURE: 98 F | HEART RATE: 86 BPM | SYSTOLIC BLOOD PRESSURE: 133 MMHG | DIASTOLIC BLOOD PRESSURE: 85 MMHG | WEIGHT: 138.01 LBS | HEIGHT: 62 IN | RESPIRATION RATE: 15 BRPM

## 2024-07-21 VITALS
SYSTOLIC BLOOD PRESSURE: 123 MMHG | DIASTOLIC BLOOD PRESSURE: 83 MMHG | OXYGEN SATURATION: 98 % | HEART RATE: 59 BPM | TEMPERATURE: 98 F | RESPIRATION RATE: 16 BRPM

## 2024-07-21 LAB
ALBUMIN SERPL ELPH-MCNC: 3.9 G/DL — SIGNIFICANT CHANGE UP (ref 3.3–5)
ALP SERPL-CCNC: 73 U/L — SIGNIFICANT CHANGE UP (ref 40–120)
ALT FLD-CCNC: 18 U/L — SIGNIFICANT CHANGE UP (ref 10–45)
ANION GAP SERPL CALC-SCNC: 13 MMOL/L — SIGNIFICANT CHANGE UP (ref 5–17)
AST SERPL-CCNC: 25 U/L — SIGNIFICANT CHANGE UP (ref 10–40)
BASOPHILS # BLD AUTO: 0.02 K/UL — SIGNIFICANT CHANGE UP (ref 0–0.2)
BASOPHILS NFR BLD AUTO: 0.3 % — SIGNIFICANT CHANGE UP (ref 0–2)
BILIRUB SERPL-MCNC: 0.4 MG/DL — SIGNIFICANT CHANGE UP (ref 0.2–1.2)
BUN SERPL-MCNC: 10 MG/DL — SIGNIFICANT CHANGE UP (ref 7–23)
CALCIUM SERPL-MCNC: 9.8 MG/DL — SIGNIFICANT CHANGE UP (ref 8.4–10.5)
CHLORIDE SERPL-SCNC: 104 MMOL/L — SIGNIFICANT CHANGE UP (ref 96–108)
CO2 SERPL-SCNC: 21 MMOL/L — LOW (ref 22–31)
CREAT SERPL-MCNC: 0.81 MG/DL — SIGNIFICANT CHANGE UP (ref 0.5–1.3)
EGFR: 76 ML/MIN/1.73M2 — SIGNIFICANT CHANGE UP
EOSINOPHIL # BLD AUTO: 0.02 K/UL — SIGNIFICANT CHANGE UP (ref 0–0.5)
EOSINOPHIL NFR BLD AUTO: 0.3 % — SIGNIFICANT CHANGE UP (ref 0–6)
GLUCOSE SERPL-MCNC: 169 MG/DL — HIGH (ref 70–99)
HCT VFR BLD CALC: 38.6 % — SIGNIFICANT CHANGE UP (ref 34.5–45)
HGB BLD-MCNC: 13.2 G/DL — SIGNIFICANT CHANGE UP (ref 11.5–15.5)
IMM GRANULOCYTES NFR BLD AUTO: 0.3 % — SIGNIFICANT CHANGE UP (ref 0–0.9)
LYMPHOCYTES # BLD AUTO: 1.87 K/UL — SIGNIFICANT CHANGE UP (ref 1–3.3)
LYMPHOCYTES # BLD AUTO: 30.7 % — SIGNIFICANT CHANGE UP (ref 13–44)
MCHC RBC-ENTMCNC: 30.5 PG — SIGNIFICANT CHANGE UP (ref 27–34)
MCHC RBC-ENTMCNC: 34.2 GM/DL — SIGNIFICANT CHANGE UP (ref 32–36)
MCV RBC AUTO: 89.1 FL — SIGNIFICANT CHANGE UP (ref 80–100)
MONOCYTES # BLD AUTO: 0.41 K/UL — SIGNIFICANT CHANGE UP (ref 0–0.9)
MONOCYTES NFR BLD AUTO: 6.7 % — SIGNIFICANT CHANGE UP (ref 2–14)
NEUTROPHILS # BLD AUTO: 3.76 K/UL — SIGNIFICANT CHANGE UP (ref 1.8–7.4)
NEUTROPHILS NFR BLD AUTO: 61.7 % — SIGNIFICANT CHANGE UP (ref 43–77)
NRBC # BLD: 0 /100 WBCS — SIGNIFICANT CHANGE UP (ref 0–0)
PLATELET # BLD AUTO: 161 K/UL — SIGNIFICANT CHANGE UP (ref 150–400)
POTASSIUM SERPL-MCNC: 4.1 MMOL/L — SIGNIFICANT CHANGE UP (ref 3.5–5.3)
POTASSIUM SERPL-SCNC: 4.1 MMOL/L — SIGNIFICANT CHANGE UP (ref 3.5–5.3)
PROT SERPL-MCNC: 7.1 G/DL — SIGNIFICANT CHANGE UP (ref 6–8.3)
RBC # BLD: 4.33 M/UL — SIGNIFICANT CHANGE UP (ref 3.8–5.2)
RBC # FLD: 11.8 % — SIGNIFICANT CHANGE UP (ref 10.3–14.5)
SODIUM SERPL-SCNC: 138 MMOL/L — SIGNIFICANT CHANGE UP (ref 135–145)
TROPONIN T, HIGH SENSITIVITY RESULT: 12 NG/L — SIGNIFICANT CHANGE UP (ref 0–51)
WBC # BLD: 6.1 K/UL — SIGNIFICANT CHANGE UP (ref 3.8–10.5)
WBC # FLD AUTO: 6.1 K/UL — SIGNIFICANT CHANGE UP (ref 3.8–10.5)

## 2024-07-21 PROCEDURE — 70450 CT HEAD/BRAIN W/O DYE: CPT | Mod: MC

## 2024-07-21 PROCEDURE — 96375 TX/PRO/DX INJ NEW DRUG ADDON: CPT

## 2024-07-21 PROCEDURE — 71046 X-RAY EXAM CHEST 2 VIEWS: CPT | Mod: 26

## 2024-07-21 PROCEDURE — 85025 COMPLETE CBC W/AUTO DIFF WBC: CPT

## 2024-07-21 PROCEDURE — 84484 ASSAY OF TROPONIN QUANT: CPT

## 2024-07-21 PROCEDURE — 80053 COMPREHEN METABOLIC PANEL: CPT

## 2024-07-21 PROCEDURE — 36415 COLL VENOUS BLD VENIPUNCTURE: CPT

## 2024-07-21 PROCEDURE — 93005 ELECTROCARDIOGRAM TRACING: CPT

## 2024-07-21 PROCEDURE — 71046 X-RAY EXAM CHEST 2 VIEWS: CPT

## 2024-07-21 PROCEDURE — 96374 THER/PROPH/DIAG INJ IV PUSH: CPT

## 2024-07-21 PROCEDURE — 70450 CT HEAD/BRAIN W/O DYE: CPT | Mod: 26,MC

## 2024-07-21 PROCEDURE — 99285 EMERGENCY DEPT VISIT HI MDM: CPT | Mod: 25

## 2024-07-21 PROCEDURE — 99285 EMERGENCY DEPT VISIT HI MDM: CPT

## 2024-07-21 RX ORDER — METOCLOPRAMIDE 5 MG/5ML
10 SOLUTION ORAL ONCE
Refills: 0 | Status: COMPLETED | OUTPATIENT
Start: 2024-07-21 | End: 2024-07-21

## 2024-07-21 RX ORDER — ACETAMINOPHEN 325 MG
1000 TABLET ORAL ONCE
Refills: 0 | Status: COMPLETED | OUTPATIENT
Start: 2024-07-21 | End: 2024-07-21

## 2024-07-21 RX ADMIN — Medication 400 MILLIGRAM(S): at 18:13

## 2024-07-21 RX ADMIN — METOCLOPRAMIDE 10 MILLIGRAM(S): 5 SOLUTION ORAL at 18:13

## 2024-07-21 NOTE — ED PROVIDER NOTE - PHYSICAL EXAMINATION
GENERAL: NAD  HEENT:  Atraumatic, eom intact  CHEST/LUNG: Chest rise equal bilaterally, clear breath sounds b/l  HEART: Regular rate and rhythm  EXTREMITIES:  Extremities warm  PSYCH: A&Ox3  SKIN: No obvious rashes or lesions  NEUROLOGY: strength and sensation intact in all extremities. CN 2 - 12 intact. Finger to nose test intact. No pronator drift. Ambulatory without difficulty.

## 2024-07-21 NOTE — ED PROVIDER NOTE - PATIENT PORTAL LINK FT
You can access the FollowMyHealth Patient Portal offered by [facility] by registering at the following website: [email or web address]. By joining [employer]’s FollowMyHealth portal, you will also be able to view your health information using other applications (apps) compatible with our system.

## 2024-07-21 NOTE — ED ADULT NURSE NOTE - OBJECTIVE STATEMENT
73 y/o Female presents to ED from home with c/o HA. Pt states HA began about 10 days ago. HA is located on left side. Denies any other symptoms. Denies SOB, CP, fever, chills, cough, dizziness, vision changes, N/V/D. Pt is A&Ox3, well appearing/ speaking full sentences without difficulty. Airway patent with spontaneous unlabored breathing, Equal B/L upper and lower extremity strength. Safety maintained bed in lowest position and locked.

## 2024-07-21 NOTE — ED PROVIDER NOTE - ATTENDING CONTRIBUTION TO CARE
Emergency Medicine Attending MD Lei:  patient seen and evaluated with the resident.  I was present for key portions of the History & Physical, and I agree with the Impression & Plan.    Patient is a 74-year-old female complaining of 10 days intermittent headaches that span her entire head..  Yesterday the headache became unilateral.  No blurry vision, no double vision, no weakness, no numbness, no slurred speech, no nausea, no vomiting.    VS: wnl  Gen: Well appearing Elderly Female in NAD  Head: NC/AT  Neck: trachea midline  Resp:  No distress  CV: RRR, no RMG  Abd: nondistended  Ext: no deformities  Neuro:  A&Ox4, pupils equal round reactive to light, extraocular movement is intact, cranial nerves II through XII within normal limits, strength 5 out of 5 bilaterally throughout, reflexes 2+ bilaterally throughout, normal tandem gait, normal finger-to-nose, SITLT.  Skin:  Warm and dry as visualized  Psych: appropriate    Medical Decision Making / Differential Diagnosis:  Elderly female with 7 days of headache syndrome, with normal neurologic exam.  Plan: CBC, CMP, screening ECG, CT head, Reglan, reassess Emergency Medicine Attending MD Lei:  patient seen and evaluated with the resident.  I was present for key portions of the History & Physical, and I agree with the Impression & Plan.    Patient is a 74-year-old female complaining of 10 days intermittent headaches that span her entire head..  Yesterday the headache became unilateral.  No blurry vision, no double vision, no weakness, no numbness, no slurred speech, no nausea, no vomiting.    VS: wnl  Gen: Well appearing Elderly Female in NAD  Head: NC/AT  Neck: trachea midline  Resp:  No distress  CV: RRR, no RMG  Abd: nondistended  Ext: no deformities  Neuro:  A&Ox4, pupils equal round reactive to light, extraocular movement is intact, cranial nerves II through XII within normal limits, strength 5 out of 5 bilaterally throughout, reflexes 2+ bilaterally throughout, normal tandem gait, normal finger-to-nose, SITLT.  Skin:  Warm and dry as visualized  Psych: appropriate    Medical Decision Making / Differential Diagnosis:  Elderly female with 7 days of headache syndrome, with normal neurologic exam.  Plan: CBC, CMP, screening ECG, CT head, Reglan, reassess    ECG directly visualized by me and shows normal sinus rhythm, rate 65, , QRS 84, QTc 436

## 2024-07-21 NOTE — ED PROVIDER NOTE - PRO INTERPRETER NEED 2
1. What type of treatment are you seeking today? Father seeks inpatient for patient. Had been inpatient @ [facility]. Has been inpatient @ [facility]- discharged 1/10. Had written suicide notes over the weekend to friends. Went to [facility] ER yesterday- no beds. Plan to overdose.    A. What times can patient attend program?    B. Can patient do virtual or in-person?      C. What is earliest date patient would be able to start programming?    D. Email address updated and/or verified in Epic?      2. Are you a  (Have Served / Still Serving) or a ?    A. Does the patient want to attend the Vet/ [facility] Program?     3. What symptoms are you having?     4. Police Escort?    A. Is patient voluntary?     B. Does patient have a legal guardian? If so, is guardian accompanying patient to intake?  DEVIN MARTINES   C. Any active medical concerns? LABS DONE @ [facility] YESTERDAY.   D. Warm transfer to RN for triage Completed?       5.  Insurance verified?       6. COVID screen:  Is the patient or anyone in their household experiencing any COVID symptoms?DENIES  Is the patient or anyone in their household currently under quarantine?DENIES  Has the patient or anyone in their household tested positive for COVID recently?TESTED + YESTERDAY @ [facility] . INITIALLY TESTED + AT [place] ON 12/30. SERVED 10 DAY QUARANTINE.   Is the patient vaccinated?             English

## 2024-07-21 NOTE — ED ADULT NURSE NOTE - NSHOSCREENINGQ1_ED_ALL_ED
PT DAILY TREATMENT NOTE     Patient Name: Miguel Perdomo  Date:11/15/2022  : 1972  [x]  Patient  Verified  Payor: BLUE CROSS / Plan: [facility] [employer]YES BCBS [address] / Product Type: PPO /    In time: 3:40P  Out time: 4:34P  Total Treatment Time (min): 47  Visit #: 1 of 10    Medicare/BCBS Only   Total Timed Codes (min):  54 1:1 Treatment Time:  47       Treatment Area: Pain in right knee [M25.561]    SUBJECTIVE  Pain Level (0-10 scale): 1-2  Any medication changes, allergies to medications, adverse drug reactions, diagnosis change, or new procedure performed?: [x] No    [] Yes (see summary sheet for update)  Subjective functional status/changes:   [] No changes reported   Pt reports a 70-75% improvement in current condition since beginning skilled PT services. Notes improvements with pain intensity and ability to ambulate, bend and go up/down stairs with greater confidence and independence. Remaining limitations reported include ascending/descending stairs with reciprocal gait pattern, ambulating at PLOF and being able to sit like she used to. MD follow up scheduled for 22. OBJECTIVE    10 min Therapeutic Exercise:  [x] See flow sheet :     Rationale: increase ROM and increase strength to improve the patient overall muscle endurance and activity tolerance for ADL's and functional tasks.       20 min Therapeutic Activity:  [x]  See flow sheet : FOTO/Goal Reassessment/ Stairs/Step Ups    Rationale: increase ROM, increase strength, and improve coordination  to improve the patients ability to return to PLOF     14 min Neuromuscular Re-education:  [x]  See flow sheet :   Rationale: increase strength, improve coordination, and increase proprioception  to improve the patients ability to perform ADL/IADLs with greater ease      10 min Self Care/Home Management: Pt ed on activity/positional modifications, appropriate ambulation progression from AD, progression of HEP w/ new print out provided, POC moving fwd w/ expected timeline to full return of PLOF - pt and pt's  verbally and physically demonstrate knowledge and understanding of all above   Rationale:  increase pt ed/awareness  to improve the patients ability to safely and effectively perform HEP, transfer in/out of bed and perform ADLs in order to minimize risk of reinjury/delay healing           With   [x] TE   [x] TA   [x] neuro   [x] other: see self care/home management  Patient Education: [x] Review HEP    [] Progressed/Changed HEP based on:   [] positioning   [] body mechanics   [] transfers   [] heat/ice application    [] other:     Other Objective/Functional Measures:   See Goals Below/P/N     Pain Level (0-10 scale) post treatment: 1-2    ASSESSMENT/Changes in Function:   Pt reports a 70-75% improvement in current condition since beginning skilled PT services. Notes improvements with pain intensity and ability to ambulate, bend and go up/down stairs with greater confidence and independence. Remaining limitations reported include ascending/descending stairs with reciprocal gait pattern, ambulating at PLOF and being able to sit like she used to. Objectively, pt demonstrates continued improvements in overall functional status, AROM, strength and stability. Continues to be hesitant initiating new activities during sessions, however with continued compliance with in person sessions and prescribed HEP, activity program will steadily progress - leading to pt reaching remaining LTGs.    Patient will continue to benefit from skilled PT services to modify and progress therapeutic interventions, address functional mobility deficits, address ROM deficits, address strength deficits, analyze and address soft tissue restrictions, analyze and cue movement patterns, analyze and modify body mechanics/ergonomics, assess and modify postural abnormalities, address imbalance/dizziness, and instruct in home and community integration to attain remaining goals.     []  See Plan of Care  [x]  See progress note/recertification  []  See Discharge Summary         Progress towards goals / Updated goals: 1. Patient will demonstrate ability to ascend/descend 4 (six inch) steps w/ B handrails, w/ reciprocal gait pattern and w/o symptom onset, in order to enter/exit home at [facility]  PN: Progressing: four inch step x6, ascends w/ reciprocal gait pattern and descends w/ step to gait pattern, Supervised (A), right ascending handrail, w/ no symptom increase, 11/15/22   2. Patient will demonstrate and report ability to ambulate >1000 ft, Ind w/o AD, on indoor and outdoor suraces to demonstrate a full return to PLOF  PN: SPC, Mod (I) on uneven surfaces; no AD, Mod (I) on level surfaces (short distances secondary to self reported fatigue), 11/15/22  3. Patient will increase FOTO score >/= 60 points to indicate increased functional mobility.      PN: Regressed: 40 points, 11/15/22     PLAN  [x]  Upgrade activities as tolerated     [x]  Continue plan of care  [x]  Update interventions per flow sheet       []  Discharge due to:_  []  Other:      Dillan Lisa DPT 11/15/2022  9:34 AM    Future Appointments   Date Time Provider Pratik Nagel   11/15/2022  3:45 PM Albaro Lyons, DPT [facility]   11/16/2022  1:30 PM ANA Salas [facility] AM[facility]   11/17/2022  3:45 PM Albaro Helms, DPT [facility]   11/21/2022  3:45 PM Albaro Helms, DPT [facility]   11/23/2022  3:45 PM Albaro Helms, DPT [facility]   11/29/2022  3:45 PM Albaro Helms, DPT [facility]   12/1/2022  3:45 PM Albaro Lyons, DPT [facility]   7/14/2023  9:15 AM [facility] LAB VISIT [facility] AM[facility]   7/21/2023  9:40 AM Shawnee Culver MD [facility] BS AMB No

## 2024-07-21 NOTE — ED PROVIDER NOTE - NSFOLLOWUPINSTRUCTIONS_ED_ALL_ED_FT
The results of any blood tests and imaging studies completed during your visit today were discussed and explained to you and a copy provided with your discharge instructions.     Please follow up with neurologist within this week.    Acute Headache    WHAT YOU NEED TO KNOW:    What is an acute headache? An acute headache is pain or discomfort that may start suddenly and get worse quickly. You may have an acute headache only when you feel stress or eat certain foods. Other acute headache pain can happen every day, and sometimes several times a day.    What are the most common types of acute headache?    Tension headache is the most common type of headache. These headaches typically occur in the late afternoon and go away by evening. The pain is usually mild or moderate. You may have problems tolerating bright light or loud noise. The pain is usually across the forehead or in the back of the head, often only on one side. These headaches may occur every day.    Migraine headaches cause moderate or severe pain. The headache generally lasts from 1 to 3 days and tends to come back. Pain is usually on only one side, but it may change sides. Migraines often occur in the temple, the back of the head, or behind the eye. The pain may throb or be sharp and steady.    A migraine with aura means you see or feel something before a migraine. You may see a small spot surrounded by bright zigzag lines. Other signs or symptoms may follow the aura.    Cluster headache pain is usually only on one side. It often causes severe pain, and can last for 30 minutes to 2 hours. These headaches may occur 1 or 2 times each day, more often at night. The pain may wake you.  Headache Types  What causes acute headaches? The cause of your headache may not be known. The following can trigger a headache:    Stress or tension, hours or even days after stressful events    Fatigue, a lack of sleep or changes in your usual sleep pattern, or a nap during the day    Menstruation, especially after pregnancy, or use of birth control pills or hormone replacement therapy    Food such as cured meats, artificial sweeteners, alcohol, dark chocolate, and MSG    Suddenly not having caffeine if you usually have larger amounts    A medical problem, such as an infection, tooth pain, neck or sinus pain, thyroid problems, or a tumor    A head injury  How is the type of acute headache diagnosed and treated? Your healthcare provider will ask you to describe your pain and rate it on a scale from 1 to 10. Tell the provider how often you have headaches and how long they last. Also describe any other symptoms you have along with headaches, such as dizziness or blurred vision. You may need tests including a CT scan to make sure there is not a leak in any blood vessels.    Medicines may be given to manage or prevent headaches. The medicine will depend on the type of acute headache you have. Do not wait until the pain is severe before you take your medicine. You may be able to take over-the-counter pain medicines as needed. Examples include NSAIDs and acetaminophen. Ask your healthcare provider which medicine is right for you. Ask how much to take and when to take it. Follow directions. These medicines can cause stomach bleeding or kidney or liver damage if not taken correctly.    Biofeedback may be used to help you manage stress. Electrodes (wires) are placed on your body and attached to a monitor. You will learn how to change stress reactions. For example, you learn to slow your heart rate when you become upset.    Cognitive behavior therapy, or stress management, may be used with other therapies to prevent headaches.  What can I do to manage my symptoms?    Apply heat or ice on the headache area. Use a heat or ice pack. For an ice pack, you can also put crushed ice in a plastic bag. Cover the pack or bag with a towel before you apply it to your skin. Ice and heat both help decrease pain, and heat also helps decrease muscle spasms. Apply heat for 20 to 30 minutes every 2 hours. Apply ice for 15 to 20 minutes every hour. Apply heat or ice for as long and for as many days as directed. You may alternate heat and ice.    Relax your muscles. Lie down in a comfortable position and close your eyes. Relax your muscles slowly. Start at your toes and work your way up your body.    Keep a record of your headaches. Write down when your headaches start and stop. Include your symptoms and what you were doing when the headache began. Record what you ate or drank for 24 hours before the headache started. Describe the pain and where it hurts. Keep track of what you did to treat your headache and if it worked.  What can I do to prevent an acute headache?    Avoid anything that triggers an acute headache. Examples include exposure to chemicals, going to high altitude, or not getting enough sleep. Create a regular sleep routine. Go to sleep at the same time and wake up at the same time each day. Do not use electronic devices before bedtime. These may trigger a headache or prevent you from sleeping well.    Do not smoke. Nicotine and other chemicals in cigarettes and cigars can trigger an acute headache or make it worse. Ask your healthcare provider for information if you currently smoke and need help to quit. E-cigarettes or smokeless tobacco still contain nicotine. Talk to your healthcare provider before you use these products.    Limit alcohol as directed. Alcohol can trigger an acute headache or make it worse. If you have cluster headaches, do not drink alcohol during an episode. For other types of headaches, ask your healthcare provider if it is safe for you to drink alcohol. Ask how much is safe for you to drink, and how often.    Exercise as directed. Exercise can reduce tension and help with headache pain. Aim for 30 minutes of physical activity on most days of the week. Your healthcare provider can help you create an exercise plan.    Eat a variety of healthy foods. Healthy foods include fruits, vegetables, low-fat dairy products, lean meats, fish, whole grains, and cooked beans. Your healthcare provider or dietitian can help you create meals plans if you need to avoid foods that trigger headaches.  When should I seek immediate care?    You have severe pain.    You have numbness or weakness on one side of your face or body.    You have a headache that occurs after a blow to the head, a fall, or other trauma.    You have a headache, are forgetful or confused, or have trouble speaking.    You have a headache, stiff neck, and a fever.  When should I call my doctor?    You have a constant headache and are vomiting.    You have a headache each day that does not get better, even after treatment.    You have changes in your headaches, or new symptoms that occur when you have a headache.    You have questions or concerns about your condition or care.  CARE AGREEMENT:    You have the right to help plan your care. Learn about your health condition and how it may be treated. Discuss treatment options with your healthcare providers to decide what care you want to receive. You always have the right to refuse treatment.

## 2024-07-21 NOTE — ED PROVIDER NOTE - NSFOLLOWUPCLINICS_GEN_ALL_ED_FT
[facility]  Neurology  [address] [place] [address]  Phone: [phone]  Fax: [phone]    [facility]  Neurology  [address] [place] [id number]  Phone: [phone]  Fax:     [facility]  Neurology  [address] [place] [address]  Phone: [phone]  Fax: [phone]

## 2024-07-21 NOTE — ED PROVIDER NOTE - OBJECTIVE STATEMENT
74-year-old female patient complaining of intermittent left-sided sharp headache radiating down left face and jaw 7 days.  Reports chest pain and shortness of breath associated with headaches.  Denies nausea no vomiting, no head fevers, no chills, no bodyaches, no cough/nasal congestion, no vision changes.

## 2025-01-02 NOTE — ED PROVIDER NOTE - NSDCPRINTRESULTS_ED_ALL_ED
Pt said she has had some insurance issues but got her lab orders done at [facility] or [facility].  Please be on the look out.  Told her we would.   Patient requests all Lab, Cardiology, and Radiology Results on their Discharge Instructions

## 2025-02-03 ENCOUNTER — NON-APPOINTMENT (OUTPATIENT)
Age: 75
End: 2025-02-03

## 2025-02-03 DIAGNOSIS — R39.9 UNSPECIFIED SYMPTOMS AND SIGNS INVOLVING THE GENITOURINARY SYSTEM: ICD-10-CM

## 2025-02-04 LAB
APPEARANCE: CLEAR
BACTERIA: NEGATIVE /HPF
BILIRUBIN URINE: NEGATIVE
BLOOD URINE: NEGATIVE
CAST: 0 /LPF
COLOR: YELLOW
EPITHELIAL CELLS: 2 /HPF
GLUCOSE QUALITATIVE U: NEGATIVE MG/DL
KETONES URINE: NEGATIVE MG/DL
LEUKOCYTE ESTERASE URINE: ABNORMAL
MICROSCOPIC-UA: NORMAL
NITRITE URINE: NEGATIVE
PH URINE: 7.5
PROTEIN URINE: NEGATIVE MG/DL
RED BLOOD CELLS URINE: 0 /HPF
SPECIFIC GRAVITY URINE: <1.005
UROBILINOGEN URINE: 0.2 MG/DL
WHITE BLOOD CELLS URINE: 0 /HPF

## 2025-02-05 LAB — BACTERIA UR CULT: NORMAL

## 2025-02-08 ENCOUNTER — APPOINTMENT (OUTPATIENT)
Dept: INTERNAL MEDICINE | Facility: CLINIC | Age: 75
End: 2025-02-08

## 2025-02-22 ENCOUNTER — APPOINTMENT (OUTPATIENT)
Dept: INTERNAL MEDICINE | Facility: CLINIC | Age: 75
End: 2025-02-22
Payer: MEDICARE

## 2025-02-22 VITALS
HEART RATE: 78 BPM | WEIGHT: 130 LBS | HEIGHT: 61 IN | BODY MASS INDEX: 24.55 KG/M2 | OXYGEN SATURATION: 99 % | DIASTOLIC BLOOD PRESSURE: 70 MMHG | SYSTOLIC BLOOD PRESSURE: 110 MMHG

## 2025-02-22 DIAGNOSIS — R42 DIZZINESS AND GIDDINESS: ICD-10-CM

## 2025-02-22 DIAGNOSIS — F41.9 ANXIETY DISORDER, UNSPECIFIED: ICD-10-CM

## 2025-02-22 DIAGNOSIS — H61.20 IMPACTED CERUMEN, UNSPECIFIED EAR: ICD-10-CM

## 2025-02-22 PROCEDURE — 99213 OFFICE O/P EST LOW 20 MIN: CPT

## 2025-02-22 PROCEDURE — 36415 COLL VENOUS BLD VENIPUNCTURE: CPT

## 2025-02-22 PROCEDURE — 93000 ELECTROCARDIOGRAM COMPLETE: CPT

## 2025-02-24 ENCOUNTER — RESULT CHARGE (OUTPATIENT)
Age: 75
End: 2025-02-24

## 2025-02-24 PROBLEM — R42 DIZZINESS: Status: ACTIVE | Noted: 2025-02-22

## 2025-02-24 PROBLEM — H61.20 WAX IN EAR: Status: ACTIVE | Noted: 2025-02-24

## 2025-02-24 PROBLEM — F41.9 ANXIETY: Status: ACTIVE | Noted: 2018-02-06

## 2025-02-26 LAB
ALBUMIN SERPL ELPH-MCNC: 4 G/DL
ALP BLD-CCNC: 65 U/L
ALT SERPL-CCNC: 15 U/L
ANION GAP SERPL CALC-SCNC: 14 MMOL/L
AST SERPL-CCNC: 24 U/L
BASOPHILS # BLD AUTO: 0.03 K/UL
BASOPHILS NFR BLD AUTO: 0.4 %
BILIRUB SERPL-MCNC: 0.5 MG/DL
BUN SERPL-MCNC: 20 MG/DL
CALCIUM SERPL-MCNC: 9.4 MG/DL
CHLORIDE SERPL-SCNC: 104 MMOL/L
CO2 SERPL-SCNC: 24 MMOL/L
CREAT SERPL-MCNC: 0.97 MG/DL
CRP SERPL-MCNC: <3 MG/L
EGFR: 61 ML/MIN/1.73M2
EOSINOPHIL # BLD AUTO: 0.04 K/UL
EOSINOPHIL NFR BLD AUTO: 0.6 %
ERYTHROCYTE [SEDIMENTATION RATE] IN BLOOD BY WESTERGREN METHOD: 21 MM/HR
ESTIMATED AVERAGE GLUCOSE: 117 MG/DL
FOLATE SERPL-MCNC: >20 NG/ML
GLUCOSE SERPL-MCNC: 70 MG/DL
HBA1C MFR BLD HPLC: 5.7 %
HCT VFR BLD CALC: 42.6 %
HGB BLD-MCNC: 14 G/DL
IMM GRANULOCYTES NFR BLD AUTO: 0.1 %
LYMPHOCYTES # BLD AUTO: 1.95 K/UL
LYMPHOCYTES NFR BLD AUTO: 28.3 %
MAN DIFF?: NORMAL
MCHC RBC-ENTMCNC: 30.4 PG
MCHC RBC-ENTMCNC: 32.9 G/DL
MCV RBC AUTO: 92.6 FL
MONOCYTES # BLD AUTO: 0.6 K/UL
MONOCYTES NFR BLD AUTO: 8.7 %
NEUTROPHILS # BLD AUTO: 4.27 K/UL
NEUTROPHILS NFR BLD AUTO: 61.9 %
PLATELET # BLD AUTO: 216 K/UL
POTASSIUM SERPL-SCNC: 4.9 MMOL/L
PROT SERPL-MCNC: 6.3 G/DL
RBC # BLD: 4.6 M/UL
RBC # FLD: 13 %
SODIUM SERPL-SCNC: 141 MMOL/L
VIT B12 SERPL-MCNC: 1197 PG/ML
WBC # FLD AUTO: 6.9 K/UL

## 2025-02-27 ENCOUNTER — RX RENEWAL (OUTPATIENT)
Age: 75
End: 2025-02-27

## 2025-03-08 LAB
25(OH)D3 SERPL-MCNC: 39.5 NG/ML
ALBUMIN SERPL ELPH-MCNC: 4.2 G/DL
ALP BLD-CCNC: 64 U/L
ALT SERPL-CCNC: 23 U/L
ANION GAP SERPL CALC-SCNC: 9 MMOL/L
AST SERPL-CCNC: 29 U/L
BASOPHILS # BLD AUTO: 0.02 K/UL
BASOPHILS NFR BLD AUTO: 0.3 %
BILIRUB SERPL-MCNC: 0.5 MG/DL
BUN SERPL-MCNC: 16 MG/DL
CALCIUM SERPL-MCNC: 9.8 MG/DL
CHLORIDE SERPL-SCNC: 105 MMOL/L
CHOLEST SERPL-MCNC: 135 MG/DL
CO2 SERPL-SCNC: 28 MMOL/L
CREAT SERPL-MCNC: 0.99 MG/DL
EGFR: 59 ML/MIN/1.73M2
EOSINOPHIL # BLD AUTO: 0.03 K/UL
EOSINOPHIL NFR BLD AUTO: 0.5 %
ESTIMATED AVERAGE GLUCOSE: 117 MG/DL
GLUCOSE SERPL-MCNC: 92 MG/DL
HBA1C MFR BLD HPLC: 5.7 %
HCT VFR BLD CALC: 43.1 %
HDLC SERPL-MCNC: 69 MG/DL
HGB BLD-MCNC: 14.5 G/DL
IMM GRANULOCYTES NFR BLD AUTO: 0.3 %
LDLC SERPL CALC-MCNC: 55 MG/DL
LYMPHOCYTES # BLD AUTO: 1.98 K/UL
LYMPHOCYTES NFR BLD AUTO: 31.9 %
MAN DIFF?: NORMAL
MCHC RBC-ENTMCNC: 30.8 PG
MCHC RBC-ENTMCNC: 33.6 G/DL
MCV RBC AUTO: 91.5 FL
MONOCYTES # BLD AUTO: 0.48 K/UL
MONOCYTES NFR BLD AUTO: 7.7 %
NEUTROPHILS # BLD AUTO: 3.68 K/UL
NEUTROPHILS NFR BLD AUTO: 59.3 %
NONHDLC SERPL-MCNC: 66 MG/DL
PLATELET # BLD AUTO: 151 K/UL
POTASSIUM SERPL-SCNC: 5.3 MMOL/L
PROT SERPL-MCNC: 6.5 G/DL
RBC # BLD: 4.71 M/UL
RBC # FLD: 12.7 %
SODIUM SERPL-SCNC: 142 MMOL/L
TRIGL SERPL-MCNC: 50 MG/DL
TSH SERPL-ACNC: 0.82 UIU/ML
VIT B12 SERPL-MCNC: 1157 PG/ML
WBC # FLD AUTO: 6.21 K/UL

## 2025-03-11 ENCOUNTER — APPOINTMENT (OUTPATIENT)
Dept: INTERNAL MEDICINE | Facility: CLINIC | Age: 75
End: 2025-03-11
Payer: MEDICARE

## 2025-03-11 ENCOUNTER — NON-APPOINTMENT (OUTPATIENT)
Age: 75
End: 2025-03-11

## 2025-03-11 VITALS
HEART RATE: 88 BPM | SYSTOLIC BLOOD PRESSURE: 130 MMHG | DIASTOLIC BLOOD PRESSURE: 82 MMHG | OXYGEN SATURATION: 97 % | WEIGHT: 129 LBS | HEIGHT: 61 IN | BODY MASS INDEX: 24.35 KG/M2

## 2025-03-11 DIAGNOSIS — E78.5 HYPERLIPIDEMIA, UNSPECIFIED: ICD-10-CM

## 2025-03-11 DIAGNOSIS — M81.0 AGE-RELATED OSTEOPOROSIS W/OUT CURRENT PATHOLOGICAL FRACTURE: ICD-10-CM

## 2025-03-11 DIAGNOSIS — Z00.00 ENCOUNTER FOR GENERAL ADULT MEDICAL EXAMINATION W/OUT ABNORMAL FINDINGS: ICD-10-CM

## 2025-03-11 PROCEDURE — G0439: CPT

## 2025-03-15 ENCOUNTER — APPOINTMENT (OUTPATIENT)
Dept: INTERNAL MEDICINE | Facility: CLINIC | Age: 75
End: 2025-03-15

## 2025-03-18 ENCOUNTER — APPOINTMENT (OUTPATIENT)
Dept: CARDIOLOGY | Facility: CLINIC | Age: 75
End: 2025-03-18
Payer: MEDICARE

## 2025-03-18 ENCOUNTER — NON-APPOINTMENT (OUTPATIENT)
Age: 75
End: 2025-03-18

## 2025-03-18 VITALS — DIASTOLIC BLOOD PRESSURE: 82 MMHG | HEART RATE: 64 BPM | SYSTOLIC BLOOD PRESSURE: 127 MMHG | OXYGEN SATURATION: 100 %

## 2025-03-18 DIAGNOSIS — R10.9 UNSPECIFIED ABDOMINAL PAIN: ICD-10-CM

## 2025-03-18 DIAGNOSIS — F17.200 NICOTINE DEPENDENCE, UNSPECIFIED, UNCOMPLICATED: ICD-10-CM

## 2025-03-18 DIAGNOSIS — F41.9 ANXIETY DISORDER, UNSPECIFIED: ICD-10-CM

## 2025-03-18 DIAGNOSIS — R06.02 SHORTNESS OF BREATH: ICD-10-CM

## 2025-03-18 PROCEDURE — 99213 OFFICE O/P EST LOW 20 MIN: CPT

## 2025-03-18 PROCEDURE — G2211 COMPLEX E/M VISIT ADD ON: CPT

## 2025-03-18 PROCEDURE — 93000 ELECTROCARDIOGRAM COMPLETE: CPT

## 2025-03-18 RX ORDER — IRON/IRON ASP GLY/FA/MV-MIN 38 125-25-1MG
TABLET ORAL
Refills: 0 | Status: ACTIVE | COMMUNITY

## 2025-03-27 ENCOUNTER — APPOINTMENT (OUTPATIENT)
Dept: CARDIOLOGY | Facility: CLINIC | Age: 75
End: 2025-03-27

## 2025-05-31 ENCOUNTER — APPOINTMENT (OUTPATIENT)
Dept: INTERNAL MEDICINE | Facility: CLINIC | Age: 75
End: 2025-05-31
Payer: MEDICARE

## 2025-05-31 VITALS
HEART RATE: 71 BPM | BODY MASS INDEX: 23.98 KG/M2 | OXYGEN SATURATION: 99 % | HEIGHT: 61 IN | SYSTOLIC BLOOD PRESSURE: 114 MMHG | WEIGHT: 127 LBS | DIASTOLIC BLOOD PRESSURE: 70 MMHG

## 2025-05-31 DIAGNOSIS — J02.9 ACUTE PHARYNGITIS, UNSPECIFIED: ICD-10-CM

## 2025-05-31 PROCEDURE — 99213 OFFICE O/P EST LOW 20 MIN: CPT

## 2025-05-31 RX ORDER — AMOXICILLIN 500 MG/1
500 CAPSULE ORAL TWICE DAILY
Qty: 20 | Refills: 0 | Status: ACTIVE | COMMUNITY
Start: 2025-05-31 | End: 1900-01-01

## 2025-06-02 LAB — BACTERIA THROAT CULT: NORMAL

## 2025-07-23 ENCOUNTER — APPOINTMENT (OUTPATIENT)
Dept: CARDIOLOGY | Facility: CLINIC | Age: 75
End: 2025-07-23

## 2025-07-23 ENCOUNTER — APPOINTMENT (OUTPATIENT)
Dept: CARDIOLOGY | Facility: CLINIC | Age: 75
End: 2025-07-23
Payer: MEDICARE

## 2025-07-23 VITALS
WEIGHT: 131 LBS | OXYGEN SATURATION: 99 % | DIASTOLIC BLOOD PRESSURE: 80 MMHG | HEART RATE: 73 BPM | SYSTOLIC BLOOD PRESSURE: 126 MMHG | BODY MASS INDEX: 24.75 KG/M2

## 2025-07-23 DIAGNOSIS — F41.9 ANXIETY DISORDER, UNSPECIFIED: ICD-10-CM

## 2025-07-23 DIAGNOSIS — E78.5 HYPERLIPIDEMIA, UNSPECIFIED: ICD-10-CM

## 2025-07-23 DIAGNOSIS — G62.9 POLYNEUROPATHY, UNSPECIFIED: ICD-10-CM

## 2025-07-23 PROCEDURE — 99213 OFFICE O/P EST LOW 20 MIN: CPT

## 2025-07-23 RX ORDER — MELOXICAM 15 MG/1
15 TABLET ORAL
Refills: 0 | Status: ACTIVE | COMMUNITY

## 2025-07-23 RX ORDER — PANTOPRAZOLE 40 MG/1
40 TABLET, DELAYED RELEASE ORAL
Refills: 0 | Status: ACTIVE | COMMUNITY

## 2025-07-24 ENCOUNTER — APPOINTMENT (OUTPATIENT)
Dept: CARDIOLOGY | Facility: CLINIC | Age: 75
End: 2025-07-24

## 2025-07-30 ENCOUNTER — APPOINTMENT (OUTPATIENT)
Dept: CARDIOLOGY | Facility: CLINIC | Age: 75
End: 2025-07-30
Payer: MEDICARE

## 2025-07-30 PROCEDURE — 93306 TTE W/DOPPLER COMPLETE: CPT

## 2025-08-22 ENCOUNTER — APPOINTMENT (OUTPATIENT)
Dept: INTERNAL MEDICINE | Facility: CLINIC | Age: 75
End: 2025-08-22
Payer: MEDICARE

## 2025-08-22 VITALS
OXYGEN SATURATION: 97 % | HEIGHT: 61 IN | SYSTOLIC BLOOD PRESSURE: 146 MMHG | BODY MASS INDEX: 24.92 KG/M2 | HEART RATE: 81 BPM | DIASTOLIC BLOOD PRESSURE: 84 MMHG | WEIGHT: 132 LBS

## 2025-08-22 DIAGNOSIS — J06.9 ACUTE UPPER RESPIRATORY INFECTION, UNSPECIFIED: ICD-10-CM

## 2025-08-22 PROCEDURE — 99214 OFFICE O/P EST MOD 30 MIN: CPT

## 2025-08-25 DIAGNOSIS — E78.5 HYPERLIPIDEMIA, UNSPECIFIED: ICD-10-CM

## 2025-08-25 DIAGNOSIS — Z13.1 ENCOUNTER FOR SCREENING FOR DIABETES MELLITUS: ICD-10-CM

## 2025-08-28 LAB
BACTERIA THROAT CULT: NORMAL
RESP PATH DNA+RNA PNL NPH NAA+NON-PROBE: NOT DETECTED
SARS-COV-2 RNA RESP QL NAA+PROBE: NOT DETECTED